# Patient Record
Sex: FEMALE | Race: WHITE | NOT HISPANIC OR LATINO
[De-identification: names, ages, dates, MRNs, and addresses within clinical notes are randomized per-mention and may not be internally consistent; named-entity substitution may affect disease eponyms.]

---

## 2018-04-23 ENCOUNTER — APPOINTMENT (OUTPATIENT)
Dept: ORTHOPEDIC SURGERY | Facility: CLINIC | Age: 83
End: 2018-04-23
Payer: MEDICARE

## 2018-04-23 VITALS
HEIGHT: 67 IN | WEIGHT: 155 LBS | DIASTOLIC BLOOD PRESSURE: 86 MMHG | BODY MASS INDEX: 24.33 KG/M2 | SYSTOLIC BLOOD PRESSURE: 172 MMHG | HEART RATE: 65 BPM

## 2018-04-23 PROCEDURE — 99214 OFFICE O/P EST MOD 30 MIN: CPT

## 2018-04-23 PROCEDURE — 73562 X-RAY EXAM OF KNEE 3: CPT | Mod: RT

## 2019-01-16 ENCOUNTER — APPOINTMENT (OUTPATIENT)
Dept: ORTHOPEDIC SURGERY | Facility: CLINIC | Age: 84
End: 2019-01-16
Payer: MEDICARE

## 2019-01-16 VITALS
SYSTOLIC BLOOD PRESSURE: 183 MMHG | HEART RATE: 58 BPM | DIASTOLIC BLOOD PRESSURE: 80 MMHG | HEIGHT: 67 IN | WEIGHT: 156 LBS | BODY MASS INDEX: 24.48 KG/M2

## 2019-01-16 PROCEDURE — 73562 X-RAY EXAM OF KNEE 3: CPT | Mod: RT

## 2019-01-16 PROCEDURE — 99214 OFFICE O/P EST MOD 30 MIN: CPT

## 2019-01-16 NOTE — HISTORY OF PRESENT ILLNESS
[de-identified] : Patient presents today for followup and reevaluation of her right total knee replacement. She did complain of some soreness and pain posteriorly and medially. She states that the pain level reaches 8/10 when it is bothering her a lot.The patient states that the pain is worse with activity and improved by rest. The patient denies numbness and tingling, radicular symptoms, or bowel/bladder dysfunction. She wears a compressive stocking which seems to improve her symptoms. She denies any clicking locking or buckling. She did note some slight swelling in the knee recently.

## 2019-01-16 NOTE — PHYSICAL EXAM
[de-identified] : The patient appears well nourished  and in no apparent distress.  The patient is alert and oriented to person, place, and time.   Affect and mood appear normal.    The head is normocephalic and atraumatic.  The eyes reveal normal sclera and extra ocular muscles are intact.   The neck appears normal with no jugular venous distention or masses noted.   Skin shows normal turgor with no evidence of eczema or psoriasis.  No respiratory distress noted.  The patient ambulates with a normal gait.\par \par The right knee has good range of motion from 0-125°. Some slight tenderness is noted in the posterior knee. No cords are palpable. No masses palpable.Negative Homans sign. There is no joint line tenderness. There is a negative [facility] sign. There is no effusion. There is no soft tissue swelling, warmth, or erythema.   There is a negative Lachman sign.  There is no instability to varus/valgus stress or anterior/posterior drawer.  There is normal strength in the in the quadriceps and hamstring muscles.  Sensation is intact to the lower estremity. There are normal pulses distally and good capillary refill. No edema or lymphadenopathy noted.  \par  [de-identified] : AP, lateral, and merchant views of the right knee were obtained.  The patient is status post total knee replacement.  The components are well fixed with good alignment. No evidence of loosening or periprosthetic fracture is noted.\par

## 2019-09-18 ENCOUNTER — APPOINTMENT (OUTPATIENT)
Dept: ORTHOPEDIC SURGERY | Facility: CLINIC | Age: 84
End: 2019-09-18
Payer: MEDICARE

## 2019-09-18 VITALS
DIASTOLIC BLOOD PRESSURE: 73 MMHG | SYSTOLIC BLOOD PRESSURE: 153 MMHG | HEART RATE: 57 BPM | BODY MASS INDEX: 24.48 KG/M2 | HEIGHT: 67 IN | WEIGHT: 156 LBS

## 2019-09-18 PROCEDURE — 20610 DRAIN/INJ JOINT/BURSA W/O US: CPT | Mod: LT

## 2019-09-18 PROCEDURE — 99214 OFFICE O/P EST MOD 30 MIN: CPT | Mod: 25

## 2019-09-18 PROCEDURE — 73564 X-RAY EXAM KNEE 4 OR MORE: CPT | Mod: LT

## 2020-02-23 ENCOUNTER — EMERGENCY (EMERGENCY)
Facility: HOSPITAL | Age: 85
LOS: 1 days | Discharge: HOME | End: 2020-02-23
Attending: EMERGENCY MEDICINE | Admitting: EMERGENCY MEDICINE
Payer: MEDICARE

## 2020-02-23 VITALS
SYSTOLIC BLOOD PRESSURE: 179 MMHG | OXYGEN SATURATION: 100 % | HEART RATE: 81 BPM | RESPIRATION RATE: 16 BRPM | TEMPERATURE: 98 F | DIASTOLIC BLOOD PRESSURE: 102 MMHG

## 2020-02-23 LAB
ALBUMIN SERPL ELPH-MCNC: 4.3 G/DL — SIGNIFICANT CHANGE UP (ref 3.3–5)
ALP SERPL-CCNC: 67 U/L — SIGNIFICANT CHANGE UP (ref 40–120)
ALT FLD-CCNC: 16 U/L — SIGNIFICANT CHANGE UP (ref 4–33)
ANION GAP SERPL CALC-SCNC: 11 MMO/L — SIGNIFICANT CHANGE UP (ref 7–14)
APPEARANCE UR: CLEAR — SIGNIFICANT CHANGE UP
AST SERPL-CCNC: 22 U/L — SIGNIFICANT CHANGE UP (ref 4–32)
BACTERIA # UR AUTO: HIGH
BASOPHILS # BLD AUTO: 0.05 K/UL — SIGNIFICANT CHANGE UP (ref 0–0.2)
BASOPHILS NFR BLD AUTO: 0.9 % — SIGNIFICANT CHANGE UP (ref 0–2)
BILIRUB SERPL-MCNC: 0.4 MG/DL — SIGNIFICANT CHANGE UP (ref 0.2–1.2)
BILIRUB UR-MCNC: NEGATIVE — SIGNIFICANT CHANGE UP
BLOOD UR QL VISUAL: NEGATIVE — SIGNIFICANT CHANGE UP
BUN SERPL-MCNC: 15 MG/DL — SIGNIFICANT CHANGE UP (ref 7–23)
CALCIUM SERPL-MCNC: 10.2 MG/DL — SIGNIFICANT CHANGE UP (ref 8.4–10.5)
CHLORIDE SERPL-SCNC: 101 MMOL/L — SIGNIFICANT CHANGE UP (ref 98–107)
CO2 SERPL-SCNC: 28 MMOL/L — SIGNIFICANT CHANGE UP (ref 22–31)
COLOR SPEC: SIGNIFICANT CHANGE UP
CREAT SERPL-MCNC: 0.89 MG/DL — SIGNIFICANT CHANGE UP (ref 0.5–1.3)
EOSINOPHIL # BLD AUTO: 0.02 K/UL — SIGNIFICANT CHANGE UP (ref 0–0.5)
EOSINOPHIL NFR BLD AUTO: 0.3 % — SIGNIFICANT CHANGE UP (ref 0–6)
GLUCOSE SERPL-MCNC: 100 MG/DL — HIGH (ref 70–99)
GLUCOSE UR-MCNC: NEGATIVE — SIGNIFICANT CHANGE UP
HCT VFR BLD CALC: 39.3 % — SIGNIFICANT CHANGE UP (ref 34.5–45)
HGB BLD-MCNC: 12.4 G/DL — SIGNIFICANT CHANGE UP (ref 11.5–15.5)
HYALINE CASTS # UR AUTO: NEGATIVE — SIGNIFICANT CHANGE UP
IMM GRANULOCYTES NFR BLD AUTO: 0.2 % — SIGNIFICANT CHANGE UP (ref 0–1.5)
KETONES UR-MCNC: NEGATIVE — SIGNIFICANT CHANGE UP
LEUKOCYTE ESTERASE UR-ACNC: SIGNIFICANT CHANGE UP
LYMPHOCYTES # BLD AUTO: 1.8 K/UL — SIGNIFICANT CHANGE UP (ref 1–3.3)
LYMPHOCYTES # BLD AUTO: 31.3 % — SIGNIFICANT CHANGE UP (ref 13–44)
MCHC RBC-ENTMCNC: 29.6 PG — SIGNIFICANT CHANGE UP (ref 27–34)
MCHC RBC-ENTMCNC: 31.6 % — LOW (ref 32–36)
MCV RBC AUTO: 93.8 FL — SIGNIFICANT CHANGE UP (ref 80–100)
MONOCYTES # BLD AUTO: 0.45 K/UL — SIGNIFICANT CHANGE UP (ref 0–0.9)
MONOCYTES NFR BLD AUTO: 7.8 % — SIGNIFICANT CHANGE UP (ref 2–14)
NEUTROPHILS # BLD AUTO: 3.42 K/UL — SIGNIFICANT CHANGE UP (ref 1.8–7.4)
NEUTROPHILS NFR BLD AUTO: 59.5 % — SIGNIFICANT CHANGE UP (ref 43–77)
NITRITE UR-MCNC: NEGATIVE — SIGNIFICANT CHANGE UP
NRBC # FLD: 0 K/UL — SIGNIFICANT CHANGE UP (ref 0–0)
PH UR: 7 — SIGNIFICANT CHANGE UP (ref 5–8)
PLATELET # BLD AUTO: 296 K/UL — SIGNIFICANT CHANGE UP (ref 150–400)
PMV BLD: 9.5 FL — SIGNIFICANT CHANGE UP (ref 7–13)
POTASSIUM SERPL-MCNC: 4 MMOL/L — SIGNIFICANT CHANGE UP (ref 3.5–5.3)
POTASSIUM SERPL-SCNC: 4 MMOL/L — SIGNIFICANT CHANGE UP (ref 3.5–5.3)
PROT SERPL-MCNC: 7.6 G/DL — SIGNIFICANT CHANGE UP (ref 6–8.3)
PROT UR-MCNC: NEGATIVE — SIGNIFICANT CHANGE UP
RBC # BLD: 4.19 M/UL — SIGNIFICANT CHANGE UP (ref 3.8–5.2)
RBC # FLD: 13.3 % — SIGNIFICANT CHANGE UP (ref 10.3–14.5)
RBC CASTS # UR COMP ASSIST: SIGNIFICANT CHANGE UP (ref 0–?)
SODIUM SERPL-SCNC: 140 MMOL/L — SIGNIFICANT CHANGE UP (ref 135–145)
SP GR SPEC: 1.01 — SIGNIFICANT CHANGE UP (ref 1–1.04)
SQUAMOUS # UR AUTO: SIGNIFICANT CHANGE UP
TROPONIN T, HIGH SENSITIVITY: 7 NG/L — SIGNIFICANT CHANGE UP (ref ?–14)
TROPONIN T, HIGH SENSITIVITY: 7 NG/L — SIGNIFICANT CHANGE UP (ref ?–14)
UROBILINOGEN FLD QL: NORMAL — SIGNIFICANT CHANGE UP
WBC # BLD: 5.75 K/UL — SIGNIFICANT CHANGE UP (ref 3.8–10.5)
WBC # FLD AUTO: 5.75 K/UL — SIGNIFICANT CHANGE UP (ref 3.8–10.5)
WBC UR QL: >50 — HIGH (ref 0–?)

## 2020-02-23 PROCEDURE — 70498 CT ANGIOGRAPHY NECK: CPT | Mod: 26

## 2020-02-23 PROCEDURE — 70496 CT ANGIOGRAPHY HEAD: CPT | Mod: 26

## 2020-02-23 PROCEDURE — 99218: CPT | Mod: GC

## 2020-02-23 RX ORDER — CEPHALEXIN 500 MG
500 CAPSULE ORAL ONCE
Refills: 0 | Status: COMPLETED | OUTPATIENT
Start: 2020-02-23 | End: 2020-02-23

## 2020-02-23 RX ORDER — SODIUM CHLORIDE 9 MG/ML
1000 INJECTION INTRAMUSCULAR; INTRAVENOUS; SUBCUTANEOUS ONCE
Refills: 0 | Status: COMPLETED | OUTPATIENT
Start: 2020-02-23 | End: 2020-02-23

## 2020-02-23 RX ORDER — ACETAMINOPHEN 500 MG
650 TABLET ORAL ONCE
Refills: 0 | Status: COMPLETED | OUTPATIENT
Start: 2020-02-23 | End: 2020-02-23

## 2020-02-23 RX ADMIN — SODIUM CHLORIDE 1000 MILLILITER(S): 9 INJECTION INTRAMUSCULAR; INTRAVENOUS; SUBCUTANEOUS at 14:43

## 2020-02-23 RX ADMIN — Medication 500 MILLIGRAM(S): at 19:42

## 2020-02-23 RX ADMIN — Medication 650 MILLIGRAM(S): at 22:30

## 2020-02-23 RX ADMIN — SODIUM CHLORIDE 1000 MILLILITER(S): 9 INJECTION INTRAMUSCULAR; INTRAVENOUS; SUBCUTANEOUS at 15:38

## 2020-02-23 RX ADMIN — Medication 650 MILLIGRAM(S): at 23:22

## 2020-02-23 NOTE — ED ADULT NURSE NOTE - OBJECTIVE STATEMENT
Pt received to room 21 AAO x 3 and ambulatory c/o right side headache that is chronic but worse and accompanied by lightheadedness and feeling unsteady since this AM. Pt states symptoms have improved since this AM and is able to ambulate with a steady gait. Labs sent, 18G placed to the right AC and NS infusing. Pt breathing with ease on room air and awaiting head CT.

## 2020-02-23 NOTE — ED PROVIDER NOTE - ATTENDING CONTRIBUTION TO CARE
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  Then she got out of bed and noticed difficulty walking.  On exam pt overall well appearing, in NAD, heart RRR, lungs CTAB, abd NTND, extremities without swelling, strength 5/5 in all extremities and skin without rash.  Speech normal.  Neck nontender.  Gait shuffling in ED--son states baseline is more fluid.

## 2020-02-23 NOTE — CONSULT NOTE ADULT - SUBJECTIVE AND OBJECTIVE BOX
HPI:    (Stroke only)  NIHSS:   MRS:   ICH:     REVIEW OF SYSTEMS  General:	  Skin/Breast:	  Ophthalmologic:  ENMT:	  Respiratory and Thorax:	  Cardiovascular:	  Gastrointestinal:	  Genitourinary:	  Musculoskeletal:	  Neurological:	  Psychiatric:	  Hematology/Lymphatics:	  Endocrine:	  Allergic/Immunologic:	    A 10-system ROS was performed and is negative except for those items noted above and/or in the HPI.    PAST MEDICAL & SURGICAL HISTORY:    FAMILY HISTORY:    SOCIAL HISTORY:   T/E/D:   Occupation:   Lives with:     MEDICATIONS (HOME):  Home Medications:    MEDICATIONS  (STANDING):  cephalexin 500 milliGRAM(s) Oral once    MEDICATIONS  (PRN):    ALLERGIES/INTOLERANCES:  Allergies  No Known Allergies    Intolerances    VITALS & EXAMINATION:  Vital Signs Last 24 Hrs  T(C): 36.6 (2020 16:34), Max: 36.9 (2020 12:47)  T(F): 97.8 (2020 16:34), Max: 98.5 (2020 12:47)  HR: 69 (2020 16:34) (69 - 81)  BP: 184/79 (2020 16:34) (179/102 - 184/79)  BP(mean): --  RR: 16 (2020 16:34) (16 - 16)  SpO2: 100% (2020 16:34) (100% - 100%)    General:  Constitutional: Obese Female, appears stated age, in no apparent distress including pain  Head: Normocephalic & atraumatic.  ENT: Patent ear canals, intact TM, mucus membranes moist & pink, neck supple, no lymphadenopathy.   Respiratory: Patent airway. All lung fields are clear to auscultation bilaterally.  Extremities: No cyanosis, clubbing, or edema.  Skin: No rashes, bruising, or discoloration.    Cardiovascular (>2): RRR no murmurs. Carotid pulsations symmetric, no bruits. Normal capillary beds refill, 1-2 seconds or less.     Neurological (>12):  MS: Awake, alert, oriented to person, place, situation, time. Normal affect. Follows all commands.    Language: Speech is clear, fluent with good repetition & comprehension (able to name objects___)    CNs: PERRLA (R = 3mm, L = 3mm). VFF. EOMI no nystagmus, no diplopia. V1-3 intact to LT/pinprick, well developed masseter muscles b/l. No facial asymmetry b/l, full eye closure strength b/l. Hearing grossly normal (rubbing fingers) b/l. Symmetric palate elevation in midline. Gag reflex deferred. Head turning & shoulder shrug intact b/l. Tongue midline, normal movements, no atrophy.    Fundoscopic: pale w/ sharp discs margins No vascular changes.      Motor: Normal muscle bulk & tone. No noticeable tremor or seizure. No pronator drift.              Deltoid	Biceps	Triceps	Wrist	Finger ABd	   R	5	5	5	5	5		5 	  L	5	5	5	5	5		5    	H-Flex	H-Ext	H-ABd	H-ADd	K-Flex	K-Ext	D-Flex	P-Flex  R	5	5	5	5	5	5	5	5 	   L	5	5	5	5	5	5	5	5	     Sensation: Intact to LT/PP/Temp/Vibration/Position b/l throughout.     Cortical: Extinction on DSS (neglect): none    Reflexes:              Biceps(C5)       BR(C6)     Triceps(C7)               Patellar(L4)    Achilles(S1)    Plantar Resp  R	2	          2	             2		        2		    2		Down   L	2	          2	             2		        2		    2		Down     Coordination: intact rapid-alt movements. No dysmetria to FTN/HTS    Gait: Normal Romberg. No postural instability. Normal stance and tandem gait.     LABORATORY:  CBC                       12.4   5.75  )-----------( 296      ( 2020 14:30 )             39.3     Chem 02-    140  |  101  |  15  ----------------------------<  100<H>  4.0   |  28  |  0.89    Ca    10.2      2020 14:30    TPro  7.6  /  Alb  4.3  /  TBili  0.4  /  DBili  x   /  AST  22  /  ALT  16  /  AlkPhos  67  02-23    LFTs LIVER FUNCTIONS - ( 2020 14:30 )  Alb: 4.3 g/dL / Pro: 7.6 g/dL / ALK PHOS: 67 u/L / ALT: 16 u/L / AST: 22 u/L / GGT: x           Coagulopathy   Lipid Panel   A1c   Cardiac enzymes     U/A Urinalysis Basic - ( 2020 14:30 )    Color: LIGHT YELLOW / Appearance: CLEAR / S.011 / pH: 7.0  Gluc: NEGATIVE / Ketone: NEGATIVE  / Bili: NEGATIVE / Urobili: NORMAL   Blood: NEGATIVE / Protein: NEGATIVE / Nitrite: NEGATIVE   Leuk Esterase: LARGE / RBC: 3-5 / WBC >50   Sq Epi: OCC / Non Sq Epi: x / Bacteria: MODERATE      CSF  Immunological  Other    STUDIES & IMAGING:  Studies (EKG, EEG, EMG, etc):     Radiology (XR, CT, MR, U/S, TTE/GATO): HPI: 87yo woman with history of right sided headaches presenting to [facility] ED for headaches associated with blurry vision and gait unsteadiness. Neurology consult for such. Patient reports to me that she has had headaches for the past 5 years that are typically right occipital and sometimes wake her up at night, however this time they woke her up around  and were associated with sudden onset blurry vision that she does not remember how long they lasted as she went back to bed soon after the symptoms started. Throughout the day she believes she has felt unsteady in her gait, has not fallen nor has not recently sustained head or neck trauma. Headache was in same region though more intense than usual. No associated photophobia, phonophobia, neck stiffness, no jaw claudication (has baseline right jaw pain from prior dental work in right part of jaw), no temporal pain, no pain with eye movements, no difficulty swallowing, no other visual disturbances, no loss of consciousness. She does endorse that in the past week, she has had some memory problems - for instance, her grandson told her yesterday that he would not be joining her for dinner and she cooked dinner for him. Denies problems with leaving stove on, denies getting lost when she walks outside, she does not drive at baseline. No trouble writing, no other associated visual disturbances. No history of smoking. Does not take any medications. has not been recently sick to her knowledge. Son at bedside denies patient having problems with memory, says patient usually walks fast and is otherwise sharp for her age. This kind of event/severity of headache and associated symptoms have never occurred before.    Of note in ED her blood pressure was elevated -180s.    REVIEW OF SYSTEMS	    A 10-system ROS was performed and is negative except for those items noted above and/or in the HPI.    PAST MEDICAL & SURGICAL HISTORY:    FAMILY HISTORY:     MEDICATIONS (HOME):  Home Medications:    MEDICATIONS  (STANDING):  cephalexin 500 milliGRAM(s) Oral once    MEDICATIONS  (PRN):    ALLERGIES/INTOLERANCES:  Allergies  No Known Allergies    VITALS & EXAMINATION:  Vital Signs Last 24 Hrs  T(C): 36.6 (2020 16:34), Max: 36.9 (2020 12:47)  T(F): 97.8 (2020 16:34), Max: 98.5 (2020 12:47)  HR: 69 (2020 16:34) (69 - 81)  BP: 184/79 (2020 16:34) (179/102 - 184/79)  BP(mean): --  RR: 16 (2020 16:34) (16 - 16)  SpO2: 100% (2020 16:34) (100% - 100%)    Neurological (>12):  MS: Awake, alert, oriented to person, place, situation, time. Normal affect. Follows all commands. 3/3 immediate and delayed recall.    Language: Speech is clear, fluent with good repetition & comprehension    CNs: PERRLA (R = 3mm, L = 3mm). VFF. EOMI no nystagmus. V1-3 intact to LT/pinprick, well developed masseter muscles b/l. No facial asymmetry b/l, full eye closure strength b/l. Hearing grossly normal (rubbing fingers) b/l. Symmetric palate elevation in midline. Gag reflex deferred. Head turning & shoulder shrug intact b/l. Tongue midline, normal movements, no atrophy.    No temporal tenderness, no neck stiffness    Motor: Normal muscle bulk & tone. No noticeable tremor or seizure. No pronator drift.              Deltoid	Biceps	Triceps	Wrist	Finger ABd	   R	5	5	5	5	5		5 	  L	5	5	5	5	5		5    	H-Flex	H-Ext	H-ABd	H-ADd	K-Flex	K-Ext	D-Flex	P-Flex  R	5	5	5	5	5	5	5	5 	   L	5	5	5	5	5	5	5	5	     Sensation: Intact to LT/Temp b/l throughout.     Cortical: Extinction on DSS (neglect): none    Reflexes:              Biceps(C5)       BR(C6)     Triceps(C7)               Patellar(L4)    Achilles(S1)    Plantar Resp  R	2	          2	             2		        1		    0		Down   L	2	          2	             2		        1		    0		Down     Coordination: intact rapid-alt movements. No dysmetria to FTN b/l    Gait: Patient reported feeling lightheaded when she went from laying to sitting up position. When she went from sitting up to standing, she did feel a sense of unsteadiness, denied room spinning dizziness, could walk, though strides were smaller than usual, and she once leaned a bit towards the left side, gait mildly wide.   can walk without assistance and get up without assistance.    LABORATORY:  CBC                       12.4   5.75  )-----------( 296      ( 2020 14:30 )             39.3     Chem     140  |  101  |  15  ----------------------------<  100<H>  4.0   |  28  |  0.89    Ca    10.2      2020 14:30    TPro  7.6  /  Alb  4.3  /  TBili  0.4  /  DBili  x   /  AST  22  /  ALT  16  /  AlkPhos  67      LFTs LIVER FUNCTIONS - ( 2020 14:30 )  Alb: 4.3 g/dL / Pro: 7.6 g/dL / ALK PHOS: 67 u/L / ALT: 16 u/L / AST: 22 u/L / GGT: x           Coagulopathy   Lipid Panel   A1c   Cardiac enzymes     U/A Urinalysis Basic - ( 2020 14:30 )    Color: LIGHT YELLOW / Appearance: CLEAR / S.011 / pH: 7.0  Gluc: NEGATIVE / Ketone: NEGATIVE  / Bili: NEGATIVE / Urobili: NORMAL   Blood: NEGATIVE / Protein: NEGATIVE / Nitrite: NEGATIVE   Leuk Esterase: LARGE / RBC: 3-5 / WBC >50   Sq Epi: OCC / Non Sq Epi: x / Bacteria: MODERATE      CSF  Immunological  Other    STUDIES & IMAGING:  Studies (EKG, EEG, EMG, etc):     Radiology (XR, CT, MR, U/S, TTE/GATO):    < from: CT Angio Neck w/ IV Cont (20 @ 17:40) >  IMPRESSION:  Patent cervical and intracranialmajor arterial vasculature without evidence of abrupt vessel cut off, hemodynamically significant stenosis, aneurysm, or dissection.    Noncontrast CT head demonstrates no acute intracranial hemorrhage.    < end of copied text >

## 2020-02-23 NOTE — ED PROVIDER NOTE - NEUROLOGICAL, MLM
Alert and oriented, no focal deficits, no motor or sensory deficits. Shuffling gait Alert and oriented, no focal deficits, no motor or sensory deficits. Shuffling gait but steady Alert and oriented, no focal deficits, no motor or sensory deficits. Shuffling gait and mildly unsteady

## 2020-02-23 NOTE — ED CDU PROVIDER INITIAL DAY NOTE - MEDICAL DECISION MAKING DETAILS
85 y/o F with HA associated with visual disturbance and unsteady gait accepted to CDU for MRI r/o stroke.

## 2020-02-23 NOTE — ED ADULT NURSE REASSESSMENT NOTE - NS ED NURSE REASSESS COMMENT FT1
covering primary RN for break pt is in bed A and Ox 3  in NAD resting comfortably, denies c/op or SOB, orders noted and completed.

## 2020-02-23 NOTE — CONSULT NOTE ADULT - ASSESSMENT
Assessment: 87yo woman with history of right sided headaches presenting to [facility] ED for headaches associated with blurry vision and gait unsteadiness. Neurological examination demonstrates hesitant and questionably mildly wide based gait. CT/CTA negative.     Impression: gait disturbance and headaches with associated blurry vision in setting of elevated blood pressure 2/2 ddx includes: PRES, hypertensive urgency, low suspicion of temporal arteritis, low suspicion of stroke    Recommendations:  [ ] Blood pressure to be gradually decreased per ED/primary team  [ ] would likely benefit from CDU for blood pressure control and MRI  [ ] MRI brain w/w/o contrast  [ ] metabolic and infectious workup  [ ] cbc, cmp, a1c, lipid profile, TSH, ESR, CRP    -Management & disposition to be discussed with Dr. Johnson

## 2020-02-23 NOTE — ED PROVIDER NOTE - CLINICAL SUMMARY MEDICAL DECISION MAKING FREE TEXT BOX
87 y/o denies pmh c/o waking up with a generalized headache x 2 am. Pt reports having headaches for the past 5-6 years, for which pt has seen multiple doctors for and had imaging performed with no diagnosis  no focal neuro deficits,  a/p: headache, worse than usual, will get cta head, neck, labs

## 2020-02-23 NOTE — ED PROVIDER NOTE - OBJECTIVE STATEMENT
87 y/o denies pmh c/o waking up with a generalized headache x 2 am. Pt reports having headaches for the past 5-6 years, for which pt has seen multiple doctors for with no diagnosis. Pt also reports when she got up from bed she experienced darkening vision and light headedness lasting for under a minute. Pt reports this headache earlier today was different than usual, generalized and severe. Pt headaches are usually on the R side. Denies fever, chills, cp, sob, abd pain, n/v/d. 85 y/o denies pmh c/o waking up with a generalized headache x 2 am. Pt reports having headaches for the past 5-6 years, for which pt has seen multiple doctors for and had imaging performed with no diagnosis. Pt reports when she got up from bed this morning shortly after the headache started, she experienced darkening vision and light headedness lasting for under a minute. Pt reports this headache earlier today was different than usual, more generalized and severe., where as pt headaches are usually on the R side. Did not take anything for the pain. Denies fever, chills, cp, sob, abd pain, n/v/d.

## 2020-02-23 NOTE — ED ADULT TRIAGE NOTE - CHIEF COMPLAINT QUOTE
pt c/o HA, neck pain and dizziness x years, with worsening of symptoms x few days. states she feels unsteady. pt had negative work up outpatient and was never diagnosed with anything. no fever/chills.  [id number]

## 2020-02-23 NOTE — ED PROVIDER NOTE - PROGRESS NOTE DETAILS
Neurology saw patient. Want MRI w/w/o contrast, recommend CDU for MRI and BP control. CDU called and will come evaluate patient.

## 2020-02-23 NOTE — ED CDU PROVIDER INITIAL DAY NOTE - ATTENDING CONTRIBUTION TO CARE
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  Then she got out of bed and noticed difficulty walking.  On exam pt overall well appearing, in NAD, heart RRR, lungs CTAB, abd NTND, extremities without swelling, strength 5/5 in all extremities and skin without rash.  Speech normal.  Neck nontender.  Gait shuffling in ED--son states baseline is more fluid.  To be placed in CDU for further neuro eval.

## 2020-02-23 NOTE — ED CDU PROVIDER INITIAL DAY NOTE - OBJECTIVE STATEMENT
Patient is a 87 y/o F denying any pmhx presenting with generalized HA associated with darkening vision, lightheadedness and unsteady gait. In ED , CT head and CTA were negative, labs did not reveal any abnormal values. patient accepted to CDU for MRI to r/o stroke. patient is being followed by neurology.

## 2020-02-23 NOTE — CONSULT NOTE ADULT - ATTENDING COMMENTS
I have personally seen, examined, and participated in the care of this patient on rounds 2/24/20 with the neurology team.  I have reviewed all pertinent clinical information, including history, physical examination, assessment and plan.   Hx as reported above with the following in addition: Pt was told years ago by physician that she has a neck problem.  Her son bought her a cervical heating pad; when she uses it her headache and neck pain sherry. I have personally seen, examined, and participated in the care of this patient on rounds 2/24/20 with the neurology team.  I have reviewed all pertinent clinical information, including history, physical examination, assessment and plan.   Hx as reported above with the following in addition: Pt was told many years ago (>10?) by a physician that she has a neck problem.  Her son bought her a cervical heating pad; when she uses it her headache and neck pain sherry.    On examination I note in particular/instead/in addition the following:    Alert. medium thin build. Normal expression, comprehension, articulation, prosody of speech.  Fields intact.  EOMI (checked conjugacy of gaze w red filter).  Tenderness to palpation )TTP) of:    upper trapezii bilat;   R SCM at mastoid insertion;   occipital notches bilaterally.      No TMJ, c-spine, scalp or paracervical TTP.     No vertigo or nystagmus elicited by     IMPRESSION    Cervical regional myofascial pain syndrome.    R-sided headache consistent with unilateral R-side SCM tenderness.    Greater occipital neuralgia - bilat.    Hx of cervicalgia managed by local heat.      Blurry vision in association with headache especially of cervical region origin is commonplace.      Not a primary headache syndrome.

## 2020-02-24 VITALS
DIASTOLIC BLOOD PRESSURE: 66 MMHG | TEMPERATURE: 98 F | HEART RATE: 64 BPM | OXYGEN SATURATION: 98 % | RESPIRATION RATE: 16 BRPM | SYSTOLIC BLOOD PRESSURE: 135 MMHG

## 2020-02-24 LAB — SPECIMEN SOURCE: SIGNIFICANT CHANGE UP

## 2020-02-24 PROCEDURE — 99284 EMERGENCY DEPT VISIT MOD MDM: CPT

## 2020-02-24 PROCEDURE — 99217: CPT | Mod: GC

## 2020-02-24 PROCEDURE — 70553 MRI BRAIN STEM W/O & W/DYE: CPT | Mod: 26

## 2020-02-24 NOTE — ED CDU PROVIDER SUBSEQUENT DAY NOTE - PROGRESS NOTE DETAILS
Pt feeling better after ER tsay, headache improved. Pt seen and eval by neuro and cleared for dc. Pt is stable for dc.

## 2020-02-24 NOTE — ED CDU PROVIDER SUBSEQUENT DAY NOTE - MEDICAL DECISION MAKING DETAILS
87 y/o F with HA associated with visual disturbance and unsteady gait accepted to CDU for MRI r/o stroke.

## 2020-02-24 NOTE — ED CDU PROVIDER SUBSEQUENT DAY NOTE - HISTORY
85 y/o F presenting with HA, visual disturbances, and unsteady gait accepted to CDU for MRI to r/o stroke. patient NAD, with no acute complaints

## 2020-02-24 NOTE — ED CDU PROVIDER SUBSEQUENT DAY NOTE - ATTENDING CONTRIBUTION TO CARE
Pt sent to CDU for HA with gait imbalance, now resolved and is able to ambulate around CDU w/o issue. MRI showing no e/o stroke, pt cleared by neuro for d/c.

## 2020-02-24 NOTE — ED CDU PROVIDER DISPOSITION NOTE - CLINICAL COURSE
Pt sent to CDU for stroke eval given headache ?ataxic gait, symptoms currently resolved, CTA head/neck and MRI w/o stroke or other acute findings. Pt cleared by neuro and will f/u with her neurologist as outpt.

## 2020-02-24 NOTE — ED ADULT NURSE REASSESSMENT NOTE - PUPILS PERRL
Problem: Pressure Injury - Risk of 
Goal: *Prevention of pressure injury Document Joni Scale and appropriate interventions in the flowsheet. Outcome: Progressing Towards Goal 
Pressure Injury Interventions: 
  
 
Moisture Interventions: Maintain skin hydration (lotion/cream), Limit adult briefs Activity Interventions: Increase time out of bed Mobility Interventions: Pressure redistribution bed/mattress (bed type) Nutrition Interventions: Offer support with meals,snacks and hydration yes

## 2020-02-24 NOTE — ED CDU PROVIDER DISPOSITION NOTE - CARE PROVIDER_API CALL
Hailey Johnson)  Neurology  General  [address] [place] [address]  Phone: [phone]  Fax: [phone]  Follow Up Time:

## 2020-02-24 NOTE — ED CDU PROVIDER DISPOSITION NOTE - PATIENT PORTAL LINK FT
You can access the FollowMyHealth Patient Portal offered by [facility] by registering at the following website: [email or web address]. By joining [facility]’s FollowMyHealth portal, you will also be able to view your health information using other applications (apps) compatible with our system.

## 2020-02-25 LAB
-  AMIKACIN: SIGNIFICANT CHANGE UP
-  AMPICILLIN/SULBACTAM: SIGNIFICANT CHANGE UP
-  AMPICILLIN: SIGNIFICANT CHANGE UP
-  AZTREONAM: SIGNIFICANT CHANGE UP
-  CEFAZOLIN: SIGNIFICANT CHANGE UP
-  CEFEPIME: SIGNIFICANT CHANGE UP
-  CEFOXITIN: SIGNIFICANT CHANGE UP
-  CEFTAZIDIME: SIGNIFICANT CHANGE UP
-  CEFTRIAXONE: SIGNIFICANT CHANGE UP
-  ERTAPENEM: SIGNIFICANT CHANGE UP
-  GENTAMICIN: SIGNIFICANT CHANGE UP
-  IMIPENEM: SIGNIFICANT CHANGE UP
-  LEVOFLOXACIN: SIGNIFICANT CHANGE UP
-  MEROPENEM: SIGNIFICANT CHANGE UP
-  NITROFURANTOIN: SIGNIFICANT CHANGE UP
-  PIPERACILLIN/TAZOBACTAM: SIGNIFICANT CHANGE UP
-  TIGECYCLINE: SIGNIFICANT CHANGE UP
-  TOBRAMYCIN: SIGNIFICANT CHANGE UP
-  TRIMETHOPRIM/SULFAMETHOXAZOLE: SIGNIFICANT CHANGE UP
BACTERIA UR CULT: SIGNIFICANT CHANGE UP
METHOD TYPE: SIGNIFICANT CHANGE UP
ORGANISM # SPEC MICROSCOPIC CNT: SIGNIFICANT CHANGE UP
ORGANISM # SPEC MICROSCOPIC CNT: SIGNIFICANT CHANGE UP

## 2020-02-26 NOTE — ED POST DISCHARGE NOTE - RESULT SUMMARY
UCX: Klebsiella pneumoniae > 100,000 No antibiotic listed in ED provider note or prescription writer at time of discharge. Patient contact # [phone] message left with Call Back  P.A. number and hours for return call back. Alt # [phone] message left with Call Back  P.A. number and hours for return call back. alt # [phone] person who answered phone hung up on me.

## 2020-03-03 RX ORDER — CEPHALEXIN 500 MG
1 CAPSULE ORAL
Qty: 15 | Refills: 0
Start: 2020-03-03 | End: 2020-03-07

## 2020-05-27 ENCOUNTER — APPOINTMENT (OUTPATIENT)
Dept: SURGERY | Facility: CLINIC | Age: 85
End: 2020-05-27
Payer: MEDICARE

## 2020-05-27 PROCEDURE — 99204 OFFICE O/P NEW MOD 45 MIN: CPT | Mod: 95

## 2020-05-28 NOTE — ASSESSMENT
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 I recommended laparoscopic wedge resection of the GIST, will plan for intraop EGD. I discussed the details, risks, benefits and alternatives of the procedure and expectations of recovery. \par She wishes to proceed and will schedule the surgery with my office.\par

## 2020-05-28 NOTE — CONSULT LETTER
[Dear  ___] : Dear  [unfilled], [Consult Letter:] : I had the pleasure of evaluating your patient, [unfilled]. [Please see my note below.] : Please see my note below. [Consult Closing:] : Thank you very much for allowing me to participate in the care of this patient.  If you have any questions, please do not hesitate to contact me. [Sincerely,] : Sincerely, [DrWestley  ___] : Dr. CUEVAS [FreeTextEntry3] : Aysha Rodriguez M.D., F.A.C.S., F.TRISH.S.C.R.S.\par Assistant Professor of Surgery\par [employer] at [facility]\par \par  [FreeTextEntry2] : Dr George Choi

## 2020-05-28 NOTE — HISTORY OF PRESENT ILLNESS
[Medical Office: ([facility])___] : at the medical office located in  [Home] : at home, [unfilled] , at the time of the visit. [Verbal consent obtained from patient] : the patient, [unfilled] [Family Member] : family member [FreeTextEntry1] : HERBERTH ASTUDILLO 86 year old female here for a consultation for a gastric GIST. The pt reports epigastric postprandial pain for the last few months that has been worsening and currently she is able to tolerate only some foods and has lost weight. The pain occurs about an hour after a meal. EGD in March revealed a large submucosal growth of the fundus. CT of the abdomen from 02/27/20 was amended after the EGD and identified the GIST. Recent EUS showed a 2.2 cm submucosal tumor 2 cm below the GE junction. Bx c/w low grade GIST.\par Colonoscopy from  05/19/16 demonstrated one 3 mm polyp in the ascending colon. Diverticulosis in the sigmoid colon, external hemorrhoids.Pathology: Sessile serrated adenoma. \par

## 2020-06-04 ENCOUNTER — OUTPATIENT (OUTPATIENT)
Dept: OUTPATIENT SERVICES | Facility: HOSPITAL | Age: 85
LOS: 1 days | End: 2020-06-04
Payer: MEDICARE

## 2020-06-04 VITALS
SYSTOLIC BLOOD PRESSURE: 157 MMHG | HEIGHT: 66 IN | OXYGEN SATURATION: 99 % | WEIGHT: 136.91 LBS | RESPIRATION RATE: 18 BRPM | DIASTOLIC BLOOD PRESSURE: 84 MMHG | HEART RATE: 86 BPM | TEMPERATURE: 98 F

## 2020-06-04 DIAGNOSIS — Z98.890 OTHER SPECIFIED POSTPROCEDURAL STATES: Chronic | ICD-10-CM

## 2020-06-04 DIAGNOSIS — Z29.9 ENCOUNTER FOR PROPHYLACTIC MEASURES, UNSPECIFIED: ICD-10-CM

## 2020-06-04 DIAGNOSIS — Z96.659 PRESENCE OF UNSPECIFIED ARTIFICIAL KNEE JOINT: Chronic | ICD-10-CM

## 2020-06-04 DIAGNOSIS — Z01.818 ENCOUNTER FOR OTHER PREPROCEDURAL EXAMINATION: ICD-10-CM

## 2020-06-04 DIAGNOSIS — C49.9 MALIGNANT NEOPLASM OF CONNECTIVE AND SOFT TISSUE, UNSPECIFIED: ICD-10-CM

## 2020-06-04 DIAGNOSIS — C49.A0 GASTROINTESTINAL STROMAL TUMOR, UNSPECIFIED SITE: ICD-10-CM

## 2020-06-04 DIAGNOSIS — I10 ESSENTIAL (PRIMARY) HYPERTENSION: ICD-10-CM

## 2020-06-04 LAB
ANION GAP SERPL CALC-SCNC: 11 MMOL/L — SIGNIFICANT CHANGE UP (ref 5–17)
BLD GP AB SCN SERPL QL: NEGATIVE — SIGNIFICANT CHANGE UP
BUN SERPL-MCNC: 11 MG/DL — SIGNIFICANT CHANGE UP (ref 7–23)
CALCIUM SERPL-MCNC: 10 MG/DL — SIGNIFICANT CHANGE UP (ref 8.4–10.5)
CHLORIDE SERPL-SCNC: 101 MMOL/L — SIGNIFICANT CHANGE UP (ref 96–108)
CO2 SERPL-SCNC: 26 MMOL/L — SIGNIFICANT CHANGE UP (ref 22–31)
CREAT SERPL-MCNC: 0.9 MG/DL — SIGNIFICANT CHANGE UP (ref 0.5–1.3)
GLUCOSE SERPL-MCNC: 97 MG/DL — SIGNIFICANT CHANGE UP (ref 70–99)
HCT VFR BLD CALC: 38.5 % — SIGNIFICANT CHANGE UP (ref 34.5–45)
HGB BLD-MCNC: 12.4 G/DL — SIGNIFICANT CHANGE UP (ref 11.5–15.5)
MCHC RBC-ENTMCNC: 29.2 PG — SIGNIFICANT CHANGE UP (ref 27–34)
MCHC RBC-ENTMCNC: 32.2 GM/DL — SIGNIFICANT CHANGE UP (ref 32–36)
MCV RBC AUTO: 90.8 FL — SIGNIFICANT CHANGE UP (ref 80–100)
NRBC # BLD: 0 /100 WBCS — SIGNIFICANT CHANGE UP (ref 0–0)
PLATELET # BLD AUTO: 308 K/UL — SIGNIFICANT CHANGE UP (ref 150–400)
POTASSIUM SERPL-MCNC: 4.6 MMOL/L — SIGNIFICANT CHANGE UP (ref 3.5–5.3)
POTASSIUM SERPL-SCNC: 4.6 MMOL/L — SIGNIFICANT CHANGE UP (ref 3.5–5.3)
RBC # BLD: 4.24 M/UL — SIGNIFICANT CHANGE UP (ref 3.8–5.2)
RBC # FLD: 13.8 % — SIGNIFICANT CHANGE UP (ref 10.3–14.5)
RH IG SCN BLD-IMP: POSITIVE — SIGNIFICANT CHANGE UP
SODIUM SERPL-SCNC: 138 MMOL/L — SIGNIFICANT CHANGE UP (ref 135–145)
WBC # BLD: 5.23 K/UL — SIGNIFICANT CHANGE UP (ref 3.8–10.5)
WBC # FLD AUTO: 5.23 K/UL — SIGNIFICANT CHANGE UP (ref 3.8–10.5)

## 2020-06-04 PROCEDURE — 80048 BASIC METABOLIC PNL TOTAL CA: CPT

## 2020-06-04 PROCEDURE — 85027 COMPLETE CBC AUTOMATED: CPT

## 2020-06-04 PROCEDURE — 86850 RBC ANTIBODY SCREEN: CPT

## 2020-06-04 PROCEDURE — G0463: CPT

## 2020-06-04 PROCEDURE — 86901 BLOOD TYPING SEROLOGIC RH(D): CPT

## 2020-06-04 PROCEDURE — 86900 BLOOD TYPING SEROLOGIC ABO: CPT

## 2020-06-04 RX ORDER — CEFAZOLIN SODIUM 1 G
2000 VIAL (EA) INJECTION ONCE
Refills: 0 | Status: DISCONTINUED | OUTPATIENT
Start: 2020-06-09 | End: 2020-06-10

## 2020-06-04 NOTE — H&P PST ADULT - HISTORY OF PRESENT ILLNESS
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

## 2020-06-04 NOTE — H&P PST ADULT - NSICDXPROBLEM_GEN_ALL_CORE_FT
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 Surgical patients in this group will benefit from both pharmacologic prophylaxis and intermittent compression devices.  The surgical team will determine the balance between VTE risk and bleeding risk, and other clinical considerations

## 2020-06-04 NOTE — H&P PST ADULT - ASSESSMENT
malignant neoplasm other connective and soft tissue  CAPRINI VTE 2.0 SCORE [CLOT updated 2019]    AGE RELATED RISK FACTORS                                                       MOBILITY RELATED FACTORS  [ ] Age 41-60 years                                            (1 Point)                    [ ] Bed rest                                                        (1 Point)  [ ] Age: 61-74 years                                           (2 Points)                  [ ] Plaster cast                                                   (2 Points)  [x ] Age= 75 years                                              (3 Points)                    [ ] Bed bound for more than 72 hours                 (2 Points)    DISEASE RELATED RISK FACTORS                                               GENDER SPECIFIC FACTORS  [ ] Edema in the lower extremities                       (1 Point)              [ ] Pregnancy                                                     (1 Point)  [ ] Varicose veins                                               (1 Point)                     [ ] Post-partum < 6 weeks                                   (1 Point)             [ ] BMI > 25 Kg/m2                                            (1 Point)                     [ ] Hormonal therapy  or oral contraception          (1 Point)                 [ ] Sepsis (in the previous month)                        (1 Point)               [ ] History of pregnancy complications                 (1 point)  [ ] Pneumonia or serious lung disease                                               [ ] Unexplained or recurrent                     (1 Point)           (in the previous month)                               (1 Point)  [ ] Abnormal pulmonary function test                     (1 Point)                 SURGERY RELATED RISK FACTORS  [ ] Acute myocardial infarction                              (1 Point)               [ ]  Section                                             (1 Point)  [ ] Congestive heart failure (in the previous month)  (1 Point)      [ ] Minor surgery                                                  (1 Point)   [ ] Inflammatory bowel disease                             (1 Point)               [ ] Arthroscopic surgery                                        (2 Points)  [ ] Central venous access                                      (2 Points)                [x ] General surgery lasting more than 45 minutes (2 points)  [x ] Malignancy- Present or previous                   (2 Points)                [ ] Elective arthroplasty                                         (5 points)    [ ] Stroke (in the previous month)                          (5 Points)                                                                                                                                                           HEMATOLOGY RELATED FACTORS                                                 TRAUMA RELATED RISK FACTORS  [ ] Prior episodes of VTE                                     (3 Points)                [ ] Fracture of the hip, pelvis, or leg                       (5 Points)  [ ] Positive family history for VTE                         (3 Points)             [ ] Acute spinal cord injury (in the previous month)  (5 Points)  [ ] Prothrombin 71958 A                                     (3 Points)               [ ] Paralysis  (less than 1 month)                             (5 Points)  [ ] Factor V Leiden                                             (3 Points)                  [ ] Multiple Trauma within 1 month                        (5 Points)  [ ] Lupus anticoagulants                                     (3 Points)                                                           [ ] Anticardiolipin antibodies                               (3 Points)                                                       [ ] High homocysteine in the blood                      (3 Points)                                             [ ] Other congenital or acquired thrombophilia      (3 Points)                                                [ ] Heparin induced thrombocytopenia                  (3 Points)                                     Total Score [     7     ]

## 2020-06-04 NOTE — H&P PST ADULT - HEALTH CARE MAINTENANCE
CHIEF COMPLAINT:   Chief Complaint   Patient presents with   • Derm Problem     Yearly Skin Check        HISTORY: Skin check, history of skin cancers, eroded pearly edge papule, left lateral abdomen     PHYSICAL EXAMINATION: Eroded pearly edge papule on the left lateral abdomen    FOLLOW-UP: No Follow-up on file.    There are no diagnoses linked to this encounter.     I have reviewed and agree with my Nurse Practitioner Zeus barnes, ARINP, NP, DCNP, documentation.  The documentation recorded by the cameron accurately and completely reflects the service(s) I personally performed and the decisions made by me.        see PCP twice a year

## 2020-06-06 ENCOUNTER — OUTPATIENT (OUTPATIENT)
Dept: OUTPATIENT SERVICES | Facility: HOSPITAL | Age: 85
LOS: 1 days | End: 2020-06-06
Payer: MEDICARE

## 2020-06-06 DIAGNOSIS — Z11.59 ENCOUNTER FOR SCREENING FOR OTHER VIRAL DISEASES: ICD-10-CM

## 2020-06-06 DIAGNOSIS — Z96.659 PRESENCE OF UNSPECIFIED ARTIFICIAL KNEE JOINT: Chronic | ICD-10-CM

## 2020-06-06 DIAGNOSIS — Z98.890 OTHER SPECIFIED POSTPROCEDURAL STATES: Chronic | ICD-10-CM

## 2020-06-06 PROBLEM — M19.90 UNSPECIFIED OSTEOARTHRITIS, UNSPECIFIED SITE: Chronic | Status: ACTIVE | Noted: 2020-06-04

## 2020-06-06 PROBLEM — I10 ESSENTIAL (PRIMARY) HYPERTENSION: Chronic | Status: ACTIVE | Noted: 2020-06-04

## 2020-06-06 LAB — SARS-COV-2 RNA SPEC QL NAA+PROBE: SIGNIFICANT CHANGE UP

## 2020-06-06 PROCEDURE — U0003: CPT

## 2020-06-08 ENCOUNTER — TRANSCRIPTION ENCOUNTER (OUTPATIENT)
Age: 85
End: 2020-06-08

## 2020-06-08 VITALS — HEIGHT: 65.75 IN | WEIGHT: 136.69 LBS

## 2020-06-08 NOTE — PRE-ANESTHESIA EVALUATION ADULT - NSANTHPMHFT_GEN_ALL_CORE
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 pt denies fever, shortness of breath , cough , in contact with anyone with COVID. +12lb weight loss in 2-3 months    Able to walk up stairs without chest pain/shortness of breath.

## 2020-06-08 NOTE — PRE-ANESTHESIA EVALUATION ADULT - NSANTHAIRWAYFT_ENT_ALL_CORE
Full upper denture, partial lower denture, remaining teeth intact. Good oropharyngeal opening. FROM of neck.

## 2020-06-08 NOTE — PRE-ANESTHESIA EVALUATION ADULT - NSANTHPEFT_GEN_ALL_CORE
T(C): 37 (06-09-20 @ 06:50), Max: 37 (06-09-20 @ 06:50)  HR: 76 (06-09-20 @ 06:50) (76 - 76)  BP: 147/93 (06-09-20 @ 06:50) (147/93 - 147/93)  RR: 18 (06-09-20 @ 06:50) (18 - 18)  SpO2: 100% (06-09-20 @ 06:50) (100% - 100%)    Pulmonary: Clear to auscultation bilaterally, no wheezing, rales, or ronchi  Cardiovascular: Regular rate and rhythm, normal S1/S2, no murmurs, rubs, or gallops  Extremities: No edema, pulses 2+ bilaterally in the upper and lower extremities  Neuro: Grossly intact in all extremities

## 2020-06-09 ENCOUNTER — APPOINTMENT (OUTPATIENT)
Dept: SURGERY | Facility: HOSPITAL | Age: 85
End: 2020-06-09
Payer: MEDICARE

## 2020-06-09 ENCOUNTER — RESULT REVIEW (OUTPATIENT)
Age: 85
End: 2020-06-09

## 2020-06-09 ENCOUNTER — INPATIENT (INPATIENT)
Facility: HOSPITAL | Age: 85
LOS: 0 days | Discharge: HOME | DRG: 376 | End: 2020-06-10
Attending: SURGERY | Admitting: SURGERY
Payer: MEDICARE

## 2020-06-09 DIAGNOSIS — Z96.659 PRESENCE OF UNSPECIFIED ARTIFICIAL KNEE JOINT: Chronic | ICD-10-CM

## 2020-06-09 DIAGNOSIS — Z98.890 OTHER SPECIFIED POSTPROCEDURAL STATES: Chronic | ICD-10-CM

## 2020-06-09 DIAGNOSIS — C49.A0 GASTROINTESTINAL STROMAL TUMOR, UNSPECIFIED SITE: ICD-10-CM

## 2020-06-09 LAB
BLD GP AB SCN SERPL QL: NEGATIVE — SIGNIFICANT CHANGE UP
RH IG SCN BLD-IMP: POSITIVE — SIGNIFICANT CHANGE UP

## 2020-06-09 PROCEDURE — 43659 UNLISTED LAPS PX STOMACH: CPT | Mod: 82

## 2020-06-09 PROCEDURE — 43259 EGD US EXAM DUODENUM/JEJUNUM: CPT

## 2020-06-09 PROCEDURE — 43659 UNLISTED LAPS PX STOMACH: CPT

## 2020-06-09 PROCEDURE — 88309 TISSUE EXAM BY PATHOLOGIST: CPT | Mod: 26

## 2020-06-09 PROCEDURE — 88341 IMHCHEM/IMCYTCHM EA ADD ANTB: CPT | Mod: 26

## 2020-06-09 PROCEDURE — 88342 IMHCHEM/IMCYTCHM 1ST ANTB: CPT | Mod: 26

## 2020-06-09 RX ORDER — ACETAMINOPHEN 500 MG
1000 TABLET ORAL ONCE
Refills: 0 | Status: COMPLETED | OUTPATIENT
Start: 2020-06-09 | End: 2020-06-09

## 2020-06-09 RX ORDER — HEPARIN SODIUM 5000 [USP'U]/ML
5000 INJECTION INTRAVENOUS; SUBCUTANEOUS EVERY 8 HOURS
Refills: 0 | Status: DISCONTINUED | OUTPATIENT
Start: 2020-06-09 | End: 2020-06-10

## 2020-06-09 RX ORDER — HYDROMORPHONE HYDROCHLORIDE 2 MG/ML
0.25 INJECTION INTRAMUSCULAR; INTRAVENOUS; SUBCUTANEOUS
Refills: 0 | Status: DISCONTINUED | OUTPATIENT
Start: 2020-06-09 | End: 2020-06-09

## 2020-06-09 RX ORDER — ACETAMINOPHEN 500 MG
1000 TABLET ORAL ONCE
Refills: 0 | Status: COMPLETED | OUTPATIENT
Start: 2020-06-10 | End: 2020-06-10

## 2020-06-09 RX ORDER — ONDANSETRON 8 MG/1
4 TABLET, FILM COATED ORAL ONCE
Refills: 0 | Status: DISCONTINUED | OUTPATIENT
Start: 2020-06-09 | End: 2020-06-09

## 2020-06-09 RX ORDER — HYDROMORPHONE HYDROCHLORIDE 2 MG/ML
0.5 INJECTION INTRAMUSCULAR; INTRAVENOUS; SUBCUTANEOUS
Refills: 0 | Status: DISCONTINUED | OUTPATIENT
Start: 2020-06-09 | End: 2020-06-09

## 2020-06-09 RX ORDER — CHLORHEXIDINE GLUCONATE 213 G/1000ML
1 SOLUTION TOPICAL ONCE
Refills: 0 | Status: COMPLETED | OUTPATIENT
Start: 2020-06-09 | End: 2020-06-09

## 2020-06-09 RX ORDER — ONDANSETRON 8 MG/1
4 TABLET, FILM COATED ORAL EVERY 6 HOURS
Refills: 0 | Status: DISCONTINUED | OUTPATIENT
Start: 2020-06-09 | End: 2020-06-10

## 2020-06-09 RX ORDER — SODIUM CHLORIDE 9 MG/ML
1000 INJECTION, SOLUTION INTRAVENOUS
Refills: 0 | Status: DISCONTINUED | OUTPATIENT
Start: 2020-06-09 | End: 2020-06-09

## 2020-06-09 RX ORDER — SODIUM CHLORIDE 9 MG/ML
3 INJECTION INTRAMUSCULAR; INTRAVENOUS; SUBCUTANEOUS EVERY 8 HOURS
Refills: 0 | Status: DISCONTINUED | OUTPATIENT
Start: 2020-06-09 | End: 2020-06-09

## 2020-06-09 RX ORDER — OXYCODONE HYDROCHLORIDE 5 MG/1
5 TABLET ORAL EVERY 6 HOURS
Refills: 0 | Status: DISCONTINUED | OUTPATIENT
Start: 2020-06-09 | End: 2020-06-10

## 2020-06-09 RX ORDER — PANTOPRAZOLE SODIUM 20 MG/1
40 TABLET, DELAYED RELEASE ORAL DAILY
Refills: 0 | Status: DISCONTINUED | OUTPATIENT
Start: 2020-06-09 | End: 2020-06-10

## 2020-06-09 RX ORDER — HYOSCYAMINE SULFATE 0.13 MG
0.12 TABLET ORAL EVERY 4 HOURS
Refills: 0 | Status: DISCONTINUED | OUTPATIENT
Start: 2020-06-09 | End: 2020-06-10

## 2020-06-09 RX ORDER — ACETAMINOPHEN 500 MG
1000 TABLET ORAL ONCE
Refills: 0 | Status: DISCONTINUED | OUTPATIENT
Start: 2020-06-10 | End: 2020-06-10

## 2020-06-09 RX ORDER — LIDOCAINE HCL 20 MG/ML
0.2 VIAL (ML) INJECTION ONCE
Refills: 0 | Status: COMPLETED | OUTPATIENT
Start: 2020-06-09 | End: 2020-06-09

## 2020-06-09 RX ADMIN — PANTOPRAZOLE SODIUM 40 MILLIGRAM(S): 20 TABLET, DELAYED RELEASE ORAL at 12:52

## 2020-06-09 RX ADMIN — CHLORHEXIDINE GLUCONATE 1 APPLICATION(S): 213 SOLUTION TOPICAL at 07:15

## 2020-06-09 RX ADMIN — HEPARIN SODIUM 5000 UNIT(S): 5000 INJECTION INTRAVENOUS; SUBCUTANEOUS at 14:09

## 2020-06-09 RX ADMIN — HEPARIN SODIUM 5000 UNIT(S): 5000 INJECTION INTRAVENOUS; SUBCUTANEOUS at 23:11

## 2020-06-09 RX ADMIN — Medication 400 MILLIGRAM(S): at 23:08

## 2020-06-09 RX ADMIN — SODIUM CHLORIDE 100 MILLILITER(S): 9 INJECTION, SOLUTION INTRAVENOUS at 12:56

## 2020-06-09 RX ADMIN — SODIUM CHLORIDE 3 MILLILITER(S): 9 INJECTION INTRAMUSCULAR; INTRAVENOUS; SUBCUTANEOUS at 07:15

## 2020-06-09 RX ADMIN — Medication 400 MILLIGRAM(S): at 17:18

## 2020-06-09 RX ADMIN — Medication 0.2 MILLILITER(S): at 07:15

## 2020-06-09 NOTE — CHART NOTE - NSCHARTNOTEFT_GEN_A_CORE
Surgery Post-Op Note    Pre-Op Dx: Gastrointestinal stromal tumor (GIST) of stomach  Procedure:    Laparoscopic partial gastrectomy     Surgeon:      Subjective:   Pt seen and examined at the bedside. Pt w/ no complaints. Denies F/C/N/V. Pain controlled with medication. Reports urinating, no gas yet.     Vital Signs Last 24 Hrs  T(C): 36.3 (09 Jun 2020 12:28), Max: 37 (09 Jun 2020 06:50)  T(F): 97.3 (09 Jun 2020 12:28), Max: 98.6 (09 Jun 2020 06:50)  HR: 65 (09 Jun 2020 12:28) (58 - 76)  BP: 153/80 (09 Jun 2020 12:28) (147/93 - 160/85)  BP(mean): 114 (09 Jun 2020 11:45) (109 - 116)  RR: 16 (09 Jun 2020 12:28) (16 - 18)  SpO2: 97% (09 Jun 2020 12:28) (96% - 100%)    Physical Exam:  General: NAD, resting comfortably in bed  Neuro: A/O x 3, no focal deficits  Pulmonary: Nonlabored breathing, no respiratory distress  Cardiovascular: NSR  Abdominal: soft, ATTP. ND  Incision: C/D/I   Drains:   Extremities: WWP      LABS:    MEDICATIONS  (STANDING):  acetaminophen  IVPB .. 1000 milliGRAM(s) IV Intermittent once  acetaminophen  IVPB .. 1000 milliGRAM(s) IV Intermittent once  ceFAZolin   IVPB 2000 milliGRAM(s) IV Intermittent once  heparin   Injectable 5000 Unit(s) SubCutaneous every 8 hours  lactated ringers. 1000 milliLiter(s) (100 mL/Hr) IV Continuous <Continuous>  pantoprazole  Injectable 40 milliGRAM(s) IV Push daily      ABO Interpretation: O (06-09 @ 07:20)      Radiology and Additional Studies:      Assessment: 86y Female s/p laparoscopic partal gastrectomy for malignant gastrointestinal stromal tumor (GIST) of stomach.    Plan:    IVF, Diet: Clear liquids, advance to LRD possibly tomorrow  Pain/nausea control PRN  Incentive spirometer/OOB/Ambulate  DVT prophylaxis Surgery Post-Op Note    Pre-Op Dx: Gastrointestinal stromal tumor (GIST) of stomach  Procedure:    Laparoscopic partial gastrectomy     Surgeon:      Subjective:   Pt seen and examined at the bedside. Pt w/ no complaints. Denies F/C/N/V. Pain controlled with medication. Reports urinating, no gas yet.     Vital Signs Last 24 Hrs  T(C): 36.3 (09 Jun 2020 12:28), Max: 37 (09 Jun 2020 06:50)  T(F): 97.3 (09 Jun 2020 12:28), Max: 98.6 (09 Jun 2020 06:50)  HR: 65 (09 Jun 2020 12:28) (58 - 76)  BP: 153/80 (09 Jun 2020 12:28) (147/93 - 160/85)  BP(mean): 114 (09 Jun 2020 11:45) (109 - 116)  RR: 16 (09 Jun 2020 12:28) (16 - 18)  SpO2: 97% (09 Jun 2020 12:28) (96% - 100%)    Physical Exam:  General: NAD, resting comfortably in bed  Neuro: A/O x 3, no focal deficits  Pulmonary: Nonlabored breathing, no respiratory distress  Cardiovascular: NSR  Abdominal: soft, ATTP. ND  Incision: C/D/I   Drains:   Extremities: WWP      LABS:    MEDICATIONS  (STANDING):  acetaminophen  IVPB .. 1000 milliGRAM(s) IV Intermittent once  acetaminophen  IVPB .. 1000 milliGRAM(s) IV Intermittent once  ceFAZolin   IVPB 2000 milliGRAM(s) IV Intermittent once  heparin   Injectable 5000 Unit(s) SubCutaneous every 8 hours  lactated ringers. 1000 milliLiter(s) (100 mL/Hr) IV Continuous <Continuous>  pantoprazole  Injectable 40 milliGRAM(s) IV Push daily      ABO Interpretation: O (06-09 @ 07:20)      Radiology and Additional Studies:      Assessment: 86y Female s/p laparoscopic partal gastrectomy for malignant gastrointestinal stromal tumor (GIST) of stomach.    Plan:    IVF, Diet: Clear liquids   Pain/nausea control PRN  Incentive spirometer/OOB/Ambulate  DVT prophylaxis

## 2020-06-09 NOTE — BRIEF OPERATIVE NOTE - NSICDXBRIEFPOSTOP_GEN_ALL_CORE_FT
POST-OP DIAGNOSIS:  Malignant gastrointestinal stromal tumor (GIST) of stomach 09-Jun-2020 10:29:58  Lynnette Mendez

## 2020-06-09 NOTE — BRIEF OPERATIVE NOTE - NSICDXBRIEFPROCEDURE_GEN_ALL_CORE_FT
PROCEDURES:  EGD 09-Jun-2020 10:29:24  Lynnette Mendez  Laparoscopic partial gastrectomy 09-Jun-2020 10:29:12  Lynnette Mendez

## 2020-06-09 NOTE — BRIEF OPERATIVE NOTE - NSICDXBRIEFPREOP_GEN_ALL_CORE_FT
PRE-OP DIAGNOSIS:  Gastrointestinal stromal tumor (GIST) of stomach 09-Jun-2020 10:29:43  Lynnette Mendez

## 2020-06-10 ENCOUNTER — TRANSCRIPTION ENCOUNTER (OUTPATIENT)
Age: 85
End: 2020-06-10

## 2020-06-10 VITALS
HEART RATE: 59 BPM | TEMPERATURE: 98 F | OXYGEN SATURATION: 97 % | DIASTOLIC BLOOD PRESSURE: 68 MMHG | SYSTOLIC BLOOD PRESSURE: 123 MMHG | RESPIRATION RATE: 18 BRPM

## 2020-06-10 LAB
ANION GAP SERPL CALC-SCNC: 10 MMOL/L — SIGNIFICANT CHANGE UP (ref 5–17)
BASOPHILS # BLD AUTO: 0.02 K/UL — SIGNIFICANT CHANGE UP (ref 0–0.2)
BASOPHILS NFR BLD AUTO: 0.3 % — SIGNIFICANT CHANGE UP (ref 0–2)
BUN SERPL-MCNC: 12 MG/DL — SIGNIFICANT CHANGE UP (ref 7–23)
CALCIUM SERPL-MCNC: 8.9 MG/DL — SIGNIFICANT CHANGE UP (ref 8.4–10.5)
CHLORIDE SERPL-SCNC: 100 MMOL/L — SIGNIFICANT CHANGE UP (ref 96–108)
CO2 SERPL-SCNC: 26 MMOL/L — SIGNIFICANT CHANGE UP (ref 22–31)
CREAT SERPL-MCNC: 0.97 MG/DL — SIGNIFICANT CHANGE UP (ref 0.5–1.3)
EOSINOPHIL # BLD AUTO: 0.01 K/UL — SIGNIFICANT CHANGE UP (ref 0–0.5)
EOSINOPHIL NFR BLD AUTO: 0.1 % — SIGNIFICANT CHANGE UP (ref 0–6)
GLUCOSE SERPL-MCNC: 92 MG/DL — SIGNIFICANT CHANGE UP (ref 70–99)
HCT VFR BLD CALC: 30.8 % — LOW (ref 34.5–45)
HGB BLD-MCNC: 9.8 G/DL — LOW (ref 11.5–15.5)
IMM GRANULOCYTES NFR BLD AUTO: 0.1 % — SIGNIFICANT CHANGE UP (ref 0–1.5)
LYMPHOCYTES # BLD AUTO: 1.78 K/UL — SIGNIFICANT CHANGE UP (ref 1–3.3)
LYMPHOCYTES # BLD AUTO: 25.3 % — SIGNIFICANT CHANGE UP (ref 13–44)
MAGNESIUM SERPL-MCNC: 2 MG/DL — SIGNIFICANT CHANGE UP (ref 1.6–2.6)
MCHC RBC-ENTMCNC: 29.1 PG — SIGNIFICANT CHANGE UP (ref 27–34)
MCHC RBC-ENTMCNC: 31.8 GM/DL — LOW (ref 32–36)
MCV RBC AUTO: 91.4 FL — SIGNIFICANT CHANGE UP (ref 80–100)
MONOCYTES # BLD AUTO: 0.54 K/UL — SIGNIFICANT CHANGE UP (ref 0–0.9)
MONOCYTES NFR BLD AUTO: 7.7 % — SIGNIFICANT CHANGE UP (ref 2–14)
NEUTROPHILS # BLD AUTO: 4.68 K/UL — SIGNIFICANT CHANGE UP (ref 1.8–7.4)
NEUTROPHILS NFR BLD AUTO: 66.5 % — SIGNIFICANT CHANGE UP (ref 43–77)
NRBC # BLD: 0 /100 WBCS — SIGNIFICANT CHANGE UP (ref 0–0)
PHOSPHATE SERPL-MCNC: 3.3 MG/DL — SIGNIFICANT CHANGE UP (ref 2.5–4.5)
PLATELET # BLD AUTO: 259 K/UL — SIGNIFICANT CHANGE UP (ref 150–400)
POTASSIUM SERPL-MCNC: 4.2 MMOL/L — SIGNIFICANT CHANGE UP (ref 3.5–5.3)
POTASSIUM SERPL-SCNC: 4.2 MMOL/L — SIGNIFICANT CHANGE UP (ref 3.5–5.3)
RBC # BLD: 3.37 M/UL — LOW (ref 3.8–5.2)
RBC # FLD: 13.9 % — SIGNIFICANT CHANGE UP (ref 10.3–14.5)
SODIUM SERPL-SCNC: 136 MMOL/L — SIGNIFICANT CHANGE UP (ref 135–145)
WBC # BLD: 7.04 K/UL — SIGNIFICANT CHANGE UP (ref 3.8–10.5)
WBC # FLD AUTO: 7.04 K/UL — SIGNIFICANT CHANGE UP (ref 3.8–10.5)

## 2020-06-10 PROCEDURE — 88341 IMHCHEM/IMCYTCHM EA ADD ANTB: CPT

## 2020-06-10 PROCEDURE — 86900 BLOOD TYPING SEROLOGIC ABO: CPT

## 2020-06-10 PROCEDURE — 83735 ASSAY OF MAGNESIUM: CPT

## 2020-06-10 PROCEDURE — 85027 COMPLETE CBC AUTOMATED: CPT

## 2020-06-10 PROCEDURE — 86850 RBC ANTIBODY SCREEN: CPT

## 2020-06-10 PROCEDURE — 88342 IMHCHEM/IMCYTCHM 1ST ANTB: CPT

## 2020-06-10 PROCEDURE — 88309 TISSUE EXAM BY PATHOLOGIST: CPT

## 2020-06-10 PROCEDURE — 80048 BASIC METABOLIC PNL TOTAL CA: CPT

## 2020-06-10 PROCEDURE — C1889: CPT

## 2020-06-10 PROCEDURE — 84100 ASSAY OF PHOSPHORUS: CPT

## 2020-06-10 PROCEDURE — 97162 PT EVAL MOD COMPLEX 30 MIN: CPT

## 2020-06-10 PROCEDURE — 86901 BLOOD TYPING SEROLOGIC RH(D): CPT

## 2020-06-10 RX ORDER — PANTOPRAZOLE SODIUM 20 MG/1
40 TABLET, DELAYED RELEASE ORAL
Refills: 0 | Status: DISCONTINUED | OUTPATIENT
Start: 2020-06-10 | End: 2020-06-10

## 2020-06-10 RX ORDER — OXYCODONE HYDROCHLORIDE 5 MG/1
5 TABLET ORAL
Qty: 8 | Refills: 0
Start: 2020-06-10

## 2020-06-10 RX ORDER — ACETAMINOPHEN 500 MG
650 TABLET ORAL EVERY 6 HOURS
Refills: 0 | Status: DISCONTINUED | OUTPATIENT
Start: 2020-06-10 | End: 2020-06-10

## 2020-06-10 RX ORDER — HYOSCYAMINE SULFATE 0.13 MG
1 TABLET ORAL
Qty: 20 | Refills: 0
Start: 2020-06-10 | End: 2020-06-14

## 2020-06-10 RX ORDER — ACETAMINOPHEN 500 MG
650 TABLET ORAL
Qty: 0 | Refills: 0 | DISCHARGE
Start: 2020-06-10

## 2020-06-10 RX ORDER — OXYCODONE HYDROCHLORIDE 5 MG/1
5 TABLET ORAL
Qty: 100 | Refills: 0
Start: 2020-06-10 | End: 2020-06-14

## 2020-06-10 RX ORDER — OXYCODONE HYDROCHLORIDE 5 MG/1
1 TABLET ORAL
Qty: 8 | Refills: 0
Start: 2020-06-10

## 2020-06-10 RX ADMIN — HEPARIN SODIUM 5000 UNIT(S): 5000 INJECTION INTRAVENOUS; SUBCUTANEOUS at 05:06

## 2020-06-10 RX ADMIN — HEPARIN SODIUM 5000 UNIT(S): 5000 INJECTION INTRAVENOUS; SUBCUTANEOUS at 13:48

## 2020-06-10 RX ADMIN — Medication 400 MILLIGRAM(S): at 05:03

## 2020-06-10 RX ADMIN — Medication 650 MILLIGRAM(S): at 11:55

## 2020-06-10 NOTE — PHYSICAL THERAPY INITIAL EVALUATION ADULT - LEVEL OF INDEPENDENCE: SIT/STAND, REHAB EVAL
"  SUBJECTIVE:   Rk Aldana is a 81 year old male who presents to clinic today for the following health issues:    Acute Illness   Acute illness concerns: cough  Onset: >2 weeks    Fever: no     Chills/Sweats: no     Headache (location?): no     Sinus Pressure:YES- post-nasal drainage    Conjunctivitis:  no    Ear Pain: no    Rhinorrhea: YES- yellow/green    Congestion: YES- nasal and possible chest    Sore Throat: no     Cough: YES-non-productive, productive of green sputum    Throughout day     No sob     Used inhaler for bronchitis in past--not often    Wheeze: no     Decreased Appetite: no    Nausea: no    Vomiting: no    Diarrhea:  no    Dysuria/Freq.: no    Fatigue/Achiness: YES- fatigue    Sick/Strep Exposure: YES     Therapies Tried and outcome: Tylenol, Decongestant, antihistamine     Daughter made patient come in for visit. History of allergies. No history of asthma.    ROS:  ROS otherwise negative    OBJECTIVE:                                                    /66 (BP Location: Right arm, Patient Position: Sitting, Cuff Size: Adult Regular)   Pulse 64   Temp 97.9  F (36.6  C) (Tympanic)   Ht 1.689 m (5' 6.5\")   Wt 91.6 kg (202 lb)   SpO2 97%   BMI 32.12 kg/m    Body mass index is 32.12 kg/m .   GENERAL: alert, no distress  HENT: ear canals- normal; TMs- normal; Nose- normal; Mouth- no ulcers, no lesions  NECK: no tenderness, no adenopathy  RESP: diminished breath sounds throughout; wheezing and prolonged expirations with forced expirations; rales in the lower lobes bilaterally R>L  CV: regular rates and rhythm, normal S1 S2, no S3 or S4 and no murmur, no click or rub    Diagnostic test results:  No results found for this or any previous visit (from the past 24 hour(s)).     ASSESSMENT/PLAN:                                                    (J18.1) Pneumonia of both lower lobes due to infectious organism (H)  (primary encounter diagnosis)  Comment: begin antibiotics. Push fluids. " Albuterol four times daily PRN. Signs for emergent evaluation discussed with patient.  Plan: azithromycin (ZITHROMAX) 250 MG tablet,         albuterol (PROAIR HFA/PROVENTIL HFA/VENTOLIN         HFA) 108 (90 Base) MCG/ACT inhaler            (R06.2) Wheezing  Comment:   Plan:     Silva Hendrickson PA-C  [facility]   independent

## 2020-06-10 NOTE — DISCHARGE NOTE PROVIDER - CARE PROVIDER_API CALL
Aysha Rodriguez  COLON/RECTAL SURGERY  [address] [place] [address]  Phone: [phone]  Fax: [phone]  Follow Up Time: Adam Junior  SURGERY  [address] [facility] [place] [address]  Phone: [phone]  Fax: [phone]  Follow Up Time: 2 weeks

## 2020-06-10 NOTE — DIETITIAN INITIAL EVALUATION ADULT. - ADD RECOMMEND
1)Continue current diet as tolerated. 2) Provided diet education of Gastric Surgery Nutrition Therapy, reinforce as needed.

## 2020-06-10 NOTE — PHYSICAL THERAPY INITIAL EVALUATION ADULT - PERTINENT HX OF CURRENT PROBLEM, REHAB EVAL
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

## 2020-06-10 NOTE — PROGRESS NOTE ADULT - ASSESSMENT
Assessment:   86F POD1 s/p laparoscopic partal gastrectomy for malignant gastrointestinal stromal tumor (GIST) of stomach. Recovering appropriately.     Plan:  - Diet: Clear liquids   - IVF until tolerating PO   - Pain control PRN   - Zofran PRN nausea   - OOB/ambulate / encourage IS   - DVT ppx: heparin sq  - F/U AM labs    [facility] Surgery   [phone]

## 2020-06-10 NOTE — PROGRESS NOTE ADULT - ATTENDING COMMENTS
I have seen and evaluated the patient and discussed the relevant clinical findings and plan with the surgical housestaff and fellow.  Agree with the above documentation with addenda as noted.     Doing well, tolerating clears  Abdomen soft, incisions clean and dry    Advance to soft diet  Anticipate discharge home later today    Aadm Junior MD

## 2020-06-10 NOTE — DIETITIAN INITIAL EVALUATION ADULT. - OTHER INFO
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 s/p laparoscopic partal gastrectomy for malignant gastrointestinal stromal tumor (GIST) of stomach.    Pt report fair to good PO intake and appetite for the last few months. Has been eating smaller more frequent meals due to feeling full. Confirms NKFA. Pt denies chewing/swallowing difficulty, nausea, vomiting, diarrhea, constipation. Takes multivitamin. Follows regular diet with varied foods.     Weights: pt reports UBW as ~ 145-150lbs Jan/Feb 2020, reports some weight loss due to multiple tests, procedures and doctors visits, has also been eating smaller portions, remains physically active. Pt noted with weight of 159lbs (9/18/19) per [facility].    Pt tolerated clear liquid try this morning, no acute GI distress, passing gas, No BM. Pt amendable to diet education. Review soft diet recommendations, avoiding no carbonated beverages/straws, consuming 5-6 small meals/day and  water from meals if feeling full. Discussed importance of adequate protein intake. Patient with no nutrition-related questions at this time. Made aware RD remains available as needed.

## 2020-06-10 NOTE — DISCHARGE NOTE NURSING/CASE MANAGEMENT/SOCIAL WORK - PATIENT PORTAL LINK FT
You can access the FollowMyHealth Patient Portal offered by [facility] by registering at the following website: [email or web address]. By joining [employer]’s FollowMyHealth portal, you will also be able to view your health information using other applications (apps) compatible with our system.

## 2020-06-10 NOTE — DIETITIAN INITIAL EVALUATION ADULT. - PHYSICAL APPEARANCE
well nourished/other (specify) Ht: 66, Wt: 136lbs, BMI: 22.kg/m2, IBW: 130lbs +/- 10%, %IBW: 105%  Edema: none Skin: free of pressure injuries per nursing flow sheets

## 2020-06-10 NOTE — PHYSICAL THERAPY INITIAL EVALUATION ADULT - ADDITIONAL COMMENTS
Pt lives alone in elevator access apartment, previously independent with all functional mobility. Pt states her daughter will be staying with her upon d/c home.

## 2020-06-10 NOTE — DISCHARGE NOTE PROVIDER - CARE PROVIDERS DIRECT ADDRESSES
[email or web address]

## 2020-06-10 NOTE — DISCHARGE NOTE PROVIDER - NSDCCPCAREPLAN_GEN_ALL_CORE_FT
PRINCIPAL DISCHARGE DIAGNOSIS  Diagnosis: Malignant neoplasm of connective or other soft tissue  Assessment and Plan of Treatment: Patient underwent resection of GIST on 6/9. Met all post op milestones. On day of discharge, the patient was tolerating diet, ambulating well and pain controlled.      SECONDARY DISCHARGE DIAGNOSES  Diagnosis: Hypertension  Assessment and Plan of Treatment: Remained wnl. f/u putpatient.

## 2020-06-10 NOTE — DISCHARGE NOTE PROVIDER - HOSPITAL COURSE
87 y/o female c/o abdominal pain, originally seen GI for consultation. Patient had a CT done that showed + mass. 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 In the PACU, the patients' pain was controlled, vitals stable, tolerating PO, and voiding appropriately.  The patient was transferred to the surgical floor in stable condition. Patient met all postoperative milestones and no acute events over night. On day of discharge, the patient was tolerating diet, ambulating well and pain controlled.

## 2020-06-10 NOTE — DISCHARGE NOTE PROVIDER - NSDCMRMEDTOKEN_GEN_ALL_CORE_FT
oxyCODONE 5 mg oral capsule: 1 cap(s) orally every 6 hours MDD:4 oxyCODONE 5 mg/5 mL oral solution: 5 milliliter(s) orally every 6 hours, As needed, Severe Pain (7 - 10) MDD:4 acetaminophen 160 mg/5 mL oral suspension: 650 milliliter(s) orally every 6 hours  hyoscyamine 0.125 mg sublingual tablet: 1 tab(s) sublingual every 6 hours, As Needed   oxyCODONE 5 mg/5 mL oral solution: 5 milliliter(s) orally every 6 hours, As needed, Severe Pain (7 - 10) MDD:4

## 2020-06-10 NOTE — DISCHARGE NOTE PROVIDER - NSDCCPTREATMENT_GEN_ALL_CORE_FT
PRINCIPAL PROCEDURE  Procedure: Laparoscopic partial gastrectomy  Findings and Treatment: 3 cm well circumscribed mass of the anterior gastric fundus      SECONDARY PROCEDURE  Procedure: EGD  Findings and Treatment:

## 2020-06-10 NOTE — DISCHARGE NOTE PROVIDER - NSDCFUADDINST_GEN_ALL_CORE_FT
Activity- No heavy lifting or straining over 15 lbs for the next two weeks;  Driving- Please do not drive until your pain is well controlled and you do not need to take pain medications.  You may shower-Do not submerge or scrub incision sites.  Please pat dry incisions/dressings.  Leave the white steri strips in place, they will fall off on their own in approximately 5-7 days.

## 2020-06-12 LAB — SURGICAL PATHOLOGY STUDY: SIGNIFICANT CHANGE UP

## 2020-06-26 ENCOUNTER — APPOINTMENT (OUTPATIENT)
Dept: SURGERY | Facility: CLINIC | Age: 85
End: 2020-06-26
Payer: MEDICARE

## 2020-06-26 VITALS
OXYGEN SATURATION: 96 % | HEART RATE: 77 BPM | BODY MASS INDEX: 20.4 KG/M2 | DIASTOLIC BLOOD PRESSURE: 74 MMHG | RESPIRATION RATE: 18 BRPM | TEMPERATURE: 98.3 F | HEIGHT: 67 IN | WEIGHT: 130 LBS | SYSTOLIC BLOOD PRESSURE: 135 MMHG

## 2020-06-26 DIAGNOSIS — R10.12 LEFT UPPER QUADRANT PAIN: ICD-10-CM

## 2020-06-26 PROCEDURE — 99213 OFFICE O/P EST LOW 20 MIN: CPT

## 2020-06-26 NOTE — PLAN
[FreeTextEntry1] : Surveillance guidelines suggest follow-up CT in 3 to 6 months.\par \par Referred patient to Dr. Rodriguez for colonoscopy.

## 2020-06-26 NOTE — PHYSICAL EXAM
[Normal] : pharynx is unremarkable, moist mucus membrane, no oral lesions [de-identified] : Soft, nondistended, incisions well-healed without erythema or drainage.  Mildly tender to deep palpation left upper quadrant without guarding or rebound.  No palpable masses. [de-identified] : Joint pain

## 2020-06-26 NOTE — HISTORY OF PRESENT ILLNESS
[de-identified] : Abram Delgado is an 86 year old female here for a post operative visit.  S/P Laparoscopic partial gastrectomy on 06/09/20  for gastrointestinal stromal tumor (GIST) of stomach.\par \par Postoperatively, the patient has been doing very well.  She reports no dysphagia and is tolerating a regular diet.  She has not had any fevers, chills, or malaise.  She notes her incisions are healing well without any drainage.\par \par The patient does report that for several months prior to this surgery, she has been experiencing persistent left-sided abdominal pain, worst postprandially, associated with borborygmus.  She says she has seen her gastroenterologist but was not told she needed colonoscopy.  She says her previous colonoscopy was approximately 4 years ago and she did have a polyp found at that time.  She reports normal bowel movements with mild constipation, no blood per rectum, no bloating.

## 2020-06-26 NOTE — ASSESSMENT
[FreeTextEntry1] : 86-year-old female recovering well status post laparoscopic resection of GIST.\par \par She has additional complaint of several month long history of left upper quadrant abdominal pain that has been present since before the surgery.

## 2020-07-01 ENCOUNTER — APPOINTMENT (OUTPATIENT)
Dept: SURGERY | Facility: CLINIC | Age: 85
End: 2020-07-01
Payer: MEDICARE

## 2020-07-01 VITALS
HEART RATE: 64 BPM | DIASTOLIC BLOOD PRESSURE: 75 MMHG | OXYGEN SATURATION: 97 % | SYSTOLIC BLOOD PRESSURE: 158 MMHG | TEMPERATURE: 98.6 F | RESPIRATION RATE: 16 BRPM

## 2020-07-01 DIAGNOSIS — Z09 ENCOUNTER FOR FOLLOW-UP EXAMINATION AFTER COMPLETED TREATMENT FOR CONDITIONS OTHER THAN MALIGNANT NEOPLASM: ICD-10-CM

## 2020-07-01 PROCEDURE — 99213 OFFICE O/P EST LOW 20 MIN: CPT

## 2020-07-01 RX ORDER — NEOMYCIN SULFATE, POLYMYXIN B SULFATE AND HYDROCORTISONE 3.5; 10000; 1 MG/ML; [IU]/ML; MG/ML
3.5-10000-1 SOLUTION AURICULAR (OTIC)
Qty: 10 | Refills: 0 | Status: DISCONTINUED | COMMUNITY
Start: 2019-06-06 | End: 2020-07-01

## 2020-07-01 RX ORDER — ATENOLOL 25 MG/1
25 TABLET ORAL
Qty: 90 | Refills: 0 | Status: DISCONTINUED | COMMUNITY
Start: 2018-10-22 | End: 2020-07-01

## 2020-07-01 RX ORDER — PNV NO.95/FERROUS FUM/FOLIC AC 28MG-0.8MG
TABLET ORAL
Refills: 0 | Status: ACTIVE | COMMUNITY

## 2020-07-01 RX ORDER — CITALOPRAM HYDROBROMIDE 20 MG/1
20 TABLET, FILM COATED ORAL
Qty: 90 | Refills: 0 | Status: DISCONTINUED | COMMUNITY
Start: 2019-03-01 | End: 2020-07-01

## 2020-07-04 PROBLEM — Z09 POSTOPERATIVE EXAMINATION: Status: ACTIVE | Noted: 2020-06-26

## 2020-07-04 NOTE — CONSULT LETTER
[Dear  ___] : Dear ~JORDEN, [Courtesy Letter:] : I had the pleasure of seeing your patient, [unfilled], in my office today. [Please see my note below.] : Please see my note below. [Consult Closing:] : Thank you very much for allowing me to participate in the care of this patient.  If you have any questions, please do not hesitate to contact me. [Sincerely,] : Sincerely, [FreeTextEntry3] : Aysha Rodriguez M.D., F.A.C.S., F.TRISH.S.C.R.S.\par Assistant Professor of Surgery\par [employer] [facility]\par \par  [FreeTextEntry2] : Dr Jim Burkett

## 2020-07-04 NOTE — PHYSICAL EXAM
[FreeTextEntry1] : This is an 86-year-old well-developed female in no apparent distress.\par \par Cardiac - regular rate and rhythm.\par \par Abdomen soft, nontender, nondistended, no masses. No hepatosplenomegaly. Port scar incisions have healed well. No hernias. \par \par Neuro-cranial nerves grossly intact. Normal gait.\par \par Psychiatric-oriented to time place and person. Good understanding of conversation.\par \par \par

## 2020-07-04 NOTE — HISTORY OF PRESENT ILLNESS
[FreeTextEntry1] : Asha Delgado is an 85 y/o female here for a  follow up visit for left lateral abdominal pain. 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 She saw Dr Junior on 6/29 and has had an uneventful recovery.  Plan is for CT surveillance in 3 to 6 months.\par However she has had persistent left sided abdominal pain for several months. Recent CT demonstrated only diverticulosis in the area. Last Colonoscopy from 05/19/16 demonstrated one 3 mm polyp in the ascending colon. Diverticulosis in the sigmoid colon, external hemorrhoids. Pathology: Sessile serrated adenoma. \par Patient has soft 2 BMs daily. Denies N/V fever, chills. Tolerating LRD. Denies the use of laxatives or analgesics. She reports the pain is significant.  Reports weight loss over the last several months.  The left-sided pain has not changed after the excision of the GIST.  She is inquiring about me doing a colonoscopy for her.

## 2020-07-04 NOTE — ASSESSMENT
[FreeTextEntry1] : 86-year-old female with left-sided abdominal pain and CT showing diverticulosis.  I recommended a diagnostic colonoscopy as the next step in her work-up. I discussed the details, risks, benefits and alternatives of the procedure. \par She will schedule the procedure with my office.\par

## 2020-08-03 DIAGNOSIS — Z01.818 ENCOUNTER FOR OTHER PREPROCEDURAL EXAMINATION: ICD-10-CM

## 2020-08-04 ENCOUNTER — APPOINTMENT (OUTPATIENT)
Dept: DISASTER EMERGENCY | Facility: CLINIC | Age: 85
End: 2020-08-04

## 2020-08-07 ENCOUNTER — APPOINTMENT (OUTPATIENT)
Dept: SURGERY | Facility: HOSPITAL | Age: 85
End: 2020-08-07

## 2020-09-01 ENCOUNTER — APPOINTMENT (OUTPATIENT)
Dept: DISASTER EMERGENCY | Facility: CLINIC | Age: 85
End: 2020-09-01

## 2020-09-02 LAB — SARS-COV-2 N GENE NPH QL NAA+PROBE: NOT DETECTED

## 2020-09-04 ENCOUNTER — RESULT REVIEW (OUTPATIENT)
Age: 85
End: 2020-09-04

## 2020-09-04 ENCOUNTER — OUTPATIENT (OUTPATIENT)
Dept: OUTPATIENT SERVICES | Facility: HOSPITAL | Age: 85
LOS: 1 days | End: 2020-09-04
Payer: MEDICARE

## 2020-09-04 ENCOUNTER — APPOINTMENT (OUTPATIENT)
Dept: SURGERY | Facility: HOSPITAL | Age: 85
End: 2020-09-04
Payer: MEDICARE

## 2020-09-04 VITALS
TEMPERATURE: 97 F | RESPIRATION RATE: 11 BRPM | SYSTOLIC BLOOD PRESSURE: 151 MMHG | HEIGHT: 64 IN | DIASTOLIC BLOOD PRESSURE: 77 MMHG | WEIGHT: 134.92 LBS | OXYGEN SATURATION: 99 % | HEART RATE: 66 BPM

## 2020-09-04 VITALS
OXYGEN SATURATION: 98 % | HEART RATE: 59 BPM | SYSTOLIC BLOOD PRESSURE: 122 MMHG | DIASTOLIC BLOOD PRESSURE: 65 MMHG | RESPIRATION RATE: 13 BRPM

## 2020-09-04 DIAGNOSIS — R10.9 UNSPECIFIED ABDOMINAL PAIN: ICD-10-CM

## 2020-09-04 DIAGNOSIS — Z98.890 OTHER SPECIFIED POSTPROCEDURAL STATES: Chronic | ICD-10-CM

## 2020-09-04 DIAGNOSIS — Z96.659 PRESENCE OF UNSPECIFIED ARTIFICIAL KNEE JOINT: Chronic | ICD-10-CM

## 2020-09-04 PROCEDURE — 88305 TISSUE EXAM BY PATHOLOGIST: CPT

## 2020-09-04 PROCEDURE — 88305 TISSUE EXAM BY PATHOLOGIST: CPT | Mod: 26

## 2020-09-04 PROCEDURE — 45380 COLONOSCOPY AND BIOPSY: CPT

## 2020-09-04 RX ORDER — SODIUM CHLORIDE 9 MG/ML
1000 INJECTION, SOLUTION INTRAVENOUS
Refills: 0 | Status: DISCONTINUED | OUTPATIENT
Start: 2020-09-04 | End: 2020-09-04

## 2020-09-04 RX ORDER — SODIUM CHLORIDE 9 MG/ML
1000 INJECTION INTRAMUSCULAR; INTRAVENOUS; SUBCUTANEOUS
Refills: 0 | Status: DISCONTINUED | OUTPATIENT
Start: 2020-09-04 | End: 2020-09-18

## 2020-09-04 RX ADMIN — SODIUM CHLORIDE 30 MILLILITER(S): 9 INJECTION INTRAMUSCULAR; INTRAVENOUS; SUBCUTANEOUS at 08:02

## 2020-09-04 NOTE — PRE PROCEDURE NOTE - PRE PROCEDURE EVALUATION
Attending Physician:     DEYANIRA Rodriguez MD                       Procedure: Colonoscopy    Indication for Procedure: Left lateral pain  ________________________________________________________  PAST MEDICAL & SURGICAL HISTORY:  Osteoarthritis  Hypertension: on no med  S/P ovarian cystectomy  History of knee replacement: right, 2013    ALLERGIES:  No Known Allergies    HOME MEDICATIONS:    AICD/PPM: [ ] yes   [ x] no    PERTINENT LAB DATA:                      PHYSICAL EXAMINATION:    Height (cm): 162.56  Weight (kg): 61.2  BMI (kg/m2): 23.2  BSA (m2): 1.65T(C): 35.9  HR: 66  BP: 151/77  RR: 11  SpO2: 99%    Constitutional: NAD  HEENT: PERRLA, EOMI,    Neck:  No JVD  Respiratory: CTAB/L  Cardiovascular: S1 and S2  Gastrointestinal: BS+, soft, NT/ND  Extremities: No peripheral edema  Neurological: A/O x 3, no focal deficits  Psychiatric: Normal mood, normal affect  Skin: No rashes    ASA Class: I [ ]  II [ x]  III [ ]  IV [ ]    COMMENTS:    The patient is a suitable candidate for the planned procedure unless box checked [x ]  No, explain:

## 2020-09-09 LAB — SURGICAL PATHOLOGY STUDY: SIGNIFICANT CHANGE UP

## 2020-09-10 DIAGNOSIS — Z85.831 PERSONAL HISTORY OF MALIGNANT NEOPLASM OF SOFT TISSUE: ICD-10-CM

## 2020-09-10 DIAGNOSIS — C49.A2 GASTROINTESTINAL STROMAL TUMOR OF STOMACH: ICD-10-CM

## 2020-10-01 ENCOUNTER — OUTPATIENT (OUTPATIENT)
Dept: OUTPATIENT SERVICES | Facility: HOSPITAL | Age: 85
LOS: 1 days | End: 2020-10-01
Payer: MEDICARE

## 2020-10-01 ENCOUNTER — APPOINTMENT (OUTPATIENT)
Dept: CT IMAGING | Facility: IMAGING CENTER | Age: 85
End: 2020-10-01
Payer: MEDICARE

## 2020-10-01 DIAGNOSIS — Z98.890 OTHER SPECIFIED POSTPROCEDURAL STATES: Chronic | ICD-10-CM

## 2020-10-01 DIAGNOSIS — Z00.8 ENCOUNTER FOR OTHER GENERAL EXAMINATION: ICD-10-CM

## 2020-10-01 DIAGNOSIS — C49.A2 GASTROINTESTINAL STROMAL TUMOR OF STOMACH: ICD-10-CM

## 2020-10-01 DIAGNOSIS — Z96.659 PRESENCE OF UNSPECIFIED ARTIFICIAL KNEE JOINT: Chronic | ICD-10-CM

## 2020-10-01 PROCEDURE — 74160 CT ABDOMEN W/CONTRAST: CPT | Mod: 26

## 2020-10-01 PROCEDURE — 74160 CT ABDOMEN W/CONTRAST: CPT

## 2020-10-01 PROCEDURE — 82565 ASSAY OF CREATININE: CPT

## 2021-01-11 ENCOUNTER — APPOINTMENT (OUTPATIENT)
Dept: SURGERY | Facility: CLINIC | Age: 86
End: 2021-01-11
Payer: MEDICARE

## 2021-01-11 VITALS
OXYGEN SATURATION: 99 % | SYSTOLIC BLOOD PRESSURE: 165 MMHG | RESPIRATION RATE: 15 BRPM | HEART RATE: 76 BPM | TEMPERATURE: 97 F | DIASTOLIC BLOOD PRESSURE: 91 MMHG

## 2021-01-11 DIAGNOSIS — R10.9 UNSPECIFIED ABDOMINAL PAIN: ICD-10-CM

## 2021-01-11 PROCEDURE — 99072 ADDL SUPL MATRL&STAF TM PHE: CPT

## 2021-01-11 PROCEDURE — 99214 OFFICE O/P EST MOD 30 MIN: CPT

## 2021-01-11 NOTE — CONSULT LETTER
[Dear  ___] : Dear  [unfilled], [Consult Letter:] : I had the pleasure of evaluating your patient, [unfilled]. [Please see my note below.] : Please see my note below. [Consult Closing:] : Thank you very much for allowing me to participate in the care of this patient.  If you have any questions, please do not hesitate to contact me. [Sincerely,] : Sincerely, [FreeTextEntry2] : Dr. Estuardo Dawson [FreeTextEntry3] : Adam Junior MD, FACS, FASMBS\par [facility]\par [address]\[place] [address]\par tel. [phone]\par fax [phone]\par  \par

## 2021-01-11 NOTE — ASSESSMENT
[FreeTextEntry1] : 87-year-old female status post laparoscopic partial gastrectomy for resection of GIST in June 2020.\par Patient with chronic left lower quadrant abdominal pain and bloating that has persisted since before her surgery. This is not surgical in nature and she does not have any palpable masses or hernias on exam.\par Patient also with new intermittent sharp left-sided chest pain.

## 2021-01-11 NOTE — HISTORY OF PRESENT ILLNESS
[de-identified] : Sandy is an 86 y/o female here for evaluation of abdominal pain. Patient is s/p laparoscopic partial gastrectomy and upper endoscopy on 6/09/20 for gastrointestinal stromal tumor. \par The patient reports she is having the same pain she had preoperatively, described as left-sided and left lower quadrant abdominal pain and persistent bloating. She states this is usually worse after eating. She continues to have intermittent constipation, and she reports her stools are thinner than usual. She states she did have a colonoscopy last September, which demonstrated moderate sigmoid diverticulosis, and impacted diverticulum, and a 2 mm polyp in the distal sigmoid colon which was resected.\par \par The patient reports bloating and belching. She does not report any dysphagia or nausea. She is tolerating regular diet.\par \par She also reports new, intermittent left-sided chest pain. She points to one spot over the left breast. She does not report any associated palpitations, shortness of breath, or reduced exercise tolerance.

## 2021-01-11 NOTE — PHYSICAL EXAM
[de-identified] : Well-appearing, no acute distress, appears stated age [de-identified] : Sclerae anicteric [de-identified] : Soft, nondistended, mildly tender to palpation left lower quadrant without guarding or rebound. Incisions are well-healed. No palpable masses. No palpable inguinal hernia. There is no tenderness over the groin areas.

## 2021-01-11 NOTE — PLAN
[FreeTextEntry1] : CT abdomen and pelvis ordered\par Recommended follow-up with her gastroenterologist\par Recommended follow-up with her primary care doctor for evaluation of chest pain.

## 2021-01-12 ENCOUNTER — OUTPATIENT (OUTPATIENT)
Dept: OUTPATIENT SERVICES | Facility: HOSPITAL | Age: 86
LOS: 1 days | End: 2021-01-12
Payer: MEDICARE

## 2021-01-12 ENCOUNTER — APPOINTMENT (OUTPATIENT)
Dept: CT IMAGING | Facility: CLINIC | Age: 86
End: 2021-01-12
Payer: MEDICARE

## 2021-01-12 DIAGNOSIS — Z98.890 OTHER SPECIFIED POSTPROCEDURAL STATES: Chronic | ICD-10-CM

## 2021-01-12 DIAGNOSIS — Z96.659 PRESENCE OF UNSPECIFIED ARTIFICIAL KNEE JOINT: Chronic | ICD-10-CM

## 2021-01-12 DIAGNOSIS — R10.9 UNSPECIFIED ABDOMINAL PAIN: ICD-10-CM

## 2021-01-12 PROCEDURE — 74177 CT ABD & PELVIS W/CONTRAST: CPT | Mod: 26

## 2021-01-12 PROCEDURE — 74177 CT ABD & PELVIS W/CONTRAST: CPT

## 2021-01-12 PROCEDURE — 82565 ASSAY OF CREATININE: CPT

## 2021-01-14 ENCOUNTER — NON-APPOINTMENT (OUTPATIENT)
Age: 86
End: 2021-01-14

## 2021-05-10 ENCOUNTER — APPOINTMENT (OUTPATIENT)
Dept: ORTHOPEDIC SURGERY | Facility: CLINIC | Age: 86
End: 2021-05-10

## 2021-05-26 ENCOUNTER — APPOINTMENT (OUTPATIENT)
Dept: ORTHOPEDIC SURGERY | Facility: CLINIC | Age: 86
End: 2021-05-26
Payer: MEDICARE

## 2021-05-26 VITALS — TEMPERATURE: 97.7 F | BODY MASS INDEX: 20.4 KG/M2 | HEIGHT: 67 IN | WEIGHT: 130 LBS

## 2021-05-26 DIAGNOSIS — M17.12 UNILATERAL PRIMARY OSTEOARTHRITIS, LEFT KNEE: ICD-10-CM

## 2021-05-26 PROCEDURE — 73564 X-RAY EXAM KNEE 4 OR MORE: CPT | Mod: LT

## 2021-05-26 PROCEDURE — 99215 OFFICE O/P EST HI 40 MIN: CPT | Mod: 25

## 2021-05-26 PROCEDURE — 20610 DRAIN/INJ JOINT/BURSA W/O US: CPT | Mod: LT

## 2021-05-26 PROCEDURE — 99072 ADDL SUPL MATRL&STAF TM PHE: CPT

## 2021-05-26 PROCEDURE — 73562 X-RAY EXAM OF KNEE 3: CPT | Mod: RT

## 2021-05-26 RX ADMIN — METHYLPREDNISOLONE ACETATE 2 MG/ML: 40 INJECTION, SUSPENSION INTRALESIONAL; INTRAMUSCULAR; INTRASYNOVIAL; SOFT TISSUE at 00:00

## 2021-05-26 RX ADMIN — LIDOCAINE HYDROCHLORIDE 3 %: 10 INJECTION, SOLUTION INFILTRATION; PERINEURAL at 00:00

## 2021-05-28 RX ORDER — METHYLPRED ACET/NACL,ISO-OS/PF 40 MG/ML
40 VIAL (ML) INJECTION
Qty: 1 | Refills: 0 | Status: COMPLETED | OUTPATIENT
Start: 2021-05-26

## 2021-05-28 RX ORDER — LIDOCAINE HYDROCHLORIDE 10 MG/ML
1 INJECTION, SOLUTION INFILTRATION; PERINEURAL
Refills: 0 | Status: COMPLETED | OUTPATIENT
Start: 2021-05-26

## 2022-01-13 NOTE — ED ADULT NURSE NOTE - DISCHARGE DATE/TIME
Diabetes Support Resources:  1. More proteins and fibers in diet.      2. Continue the Metformin  mg take 2 tablets twice per day    3. Start Trulicity 4.5 mg weekly    4. Start exercising such as walking    5. Follow up with Dr. Lomeli, this month    A1C after 4/12/2022     Bring blood glucose meter and logbook with you to all doctor and follow-up appointments.    Diabetes Education Telephone Visit Follow-up:    We realize your time is valuable and your health is important! We offer a convenient Telephone Visit follow up! It s a quick way to check in for a medication dose adjustment without having to come back to clinic as soon.    Telephone Visits are often covered by insurance. Please check with your insurance plan to see if this type of visit is covered. If not, the cost is less expensive than an office visit:      Up to 10 minutes (Code 30847): $30    11-20 minutes (Code 10762): $59    More than 20 minutes (Code 37423): $85    Talk with your Diabetes Educator if you want to learn more.      [facility]:  For Your Diabetes Education and Nutrition Appointments Call:  [phone]   For Diabetes Education or Nutrition Related Questions:   Phone: [phone]  Send ePub Direct Message   If you need a medication refill please contact your pharmacy. Please allow 3 business days for your refills to be completed.    
24-Feb-2020 11:28

## 2022-02-22 ENCOUNTER — NON-APPOINTMENT (OUTPATIENT)
Age: 87
End: 2022-02-22

## 2022-02-22 ENCOUNTER — APPOINTMENT (OUTPATIENT)
Dept: CARDIOLOGY | Facility: CLINIC | Age: 87
End: 2022-02-22
Payer: MEDICARE

## 2022-02-22 VITALS — DIASTOLIC BLOOD PRESSURE: 63 MMHG | SYSTOLIC BLOOD PRESSURE: 141 MMHG

## 2022-02-22 VITALS
OXYGEN SATURATION: 99 % | TEMPERATURE: 97.7 F | HEART RATE: 61 BPM | RESPIRATION RATE: 15 BRPM | DIASTOLIC BLOOD PRESSURE: 82 MMHG | WEIGHT: 130 LBS | SYSTOLIC BLOOD PRESSURE: 172 MMHG | BODY MASS INDEX: 20.36 KG/M2

## 2022-02-22 DIAGNOSIS — I10 ESSENTIAL (PRIMARY) HYPERTENSION: ICD-10-CM

## 2022-02-22 DIAGNOSIS — R07.9 CHEST PAIN, UNSPECIFIED: ICD-10-CM

## 2022-02-22 DIAGNOSIS — N39.0 URINARY TRACT INFECTION, SITE NOT SPECIFIED: ICD-10-CM

## 2022-02-22 PROCEDURE — 93306 TTE W/DOPPLER COMPLETE: CPT

## 2022-02-22 PROCEDURE — 99204 OFFICE O/P NEW MOD 45 MIN: CPT

## 2022-02-22 PROCEDURE — 93000 ELECTROCARDIOGRAM COMPLETE: CPT | Mod: 59

## 2022-02-22 PROCEDURE — 93015 CV STRESS TEST SUPVJ I&R: CPT

## 2022-02-22 RX ORDER — FOLIC ACID 1 MG/1
1 TABLET ORAL
Refills: 0 | Status: ACTIVE | COMMUNITY

## 2022-02-22 RX ORDER — MULTIVIT-MIN/FA/LYCOPEN/LUTEIN .4-300-25
TABLET ORAL
Refills: 0 | Status: ACTIVE | COMMUNITY

## 2022-02-22 RX ORDER — ESCITALOPRAM OXALATE 10 MG/1
10 TABLET ORAL
Refills: 0 | Status: ACTIVE | COMMUNITY

## 2022-02-22 RX ORDER — NITROFURANTOIN MACROCRYSTALS 100 MG/1
100 CAPSULE ORAL TWICE DAILY
Refills: 0 | Status: ACTIVE | COMMUNITY

## 2022-02-22 NOTE — CARDIOLOGY SUMMARY
[___] : [unfilled] [de-identified] : 2- stress echocardiogram with  EF 70%, negative  for ischemia,  TTE with LVH\par 2-, repeating the test, result pending.

## 2022-02-22 NOTE — DISCUSSION/SUMMARY
[Stress Echocardiogram] : stress echocardiogram [With Me] : with me [___ Month(s)] : in [unfilled] month(s) [Hypertension] : hypertension [Stable] : stable [None] : There are no changes in medication management [Exercise Regimen] : an exercise regimen [Dietary Modification] : dietary modification [Acetaminophen Avoidance] : acetaminophen  avoidance [Low Sodium Diet] : low sodium diet [NSAIDs Avoidance] : non-steroidal anti-inflammatory drugs avoidance [Patient] : the patient [Family] : the patient's family [Minutes Spent: ___] : for [unfilled] ~Uminutes [de-identified] : atypical nature for angina. [de-identified] : or stress pect. [de-identified] : pending the stress echocardiogram. [de-identified] : reactive hypertension

## 2022-02-22 NOTE — PHYSICAL EXAM
[General Appearance - Well Developed] : well developed [Normal Appearance] : normal appearance [Well Groomed] : well groomed [General Appearance - Well Nourished] : well nourished [No Deformities] : no deformities [General Appearance - In No Acute Distress] : no acute distress [Normal Conjunctiva] : the conjunctiva exhibited no abnormalities [Eyelids - No Xanthelasma] : the eyelids demonstrated no xanthelasmas [Normal Oral Mucosa] : normal oral mucosa [No Oral Pallor] : no oral pallor [No Oral Cyanosis] : no oral cyanosis [Normal Jugular Venous A Waves Present] : normal jugular venous A waves present [Normal Jugular Venous V Waves Present] : normal jugular venous V waves present [No Jugular Venous Clemons A Waves] : no jugular venous clemons A waves [Heart Rate And Rhythm] : heart rate and rhythm were normal [Heart Sounds] : normal S1 and S2 [Murmurs] : no murmurs present [Arterial Pulses Normal] : the arterial pulses were normal [Edema] : no peripheral edema present [Veins - Varicosity Changes] : no varicosital changes were noted in the lower extremities [Respiration, Rhythm And Depth] : normal respiratory rhythm and effort [Exaggerated Use Of Accessory Muscles For Inspiration] : no accessory muscle use [Auscultation Breath Sounds / Voice Sounds] : lungs were clear to auscultation bilaterally [Chest Palpation] : palpation of the chest revealed no abnormalities [Lungs Percussion] : the lungs were normal to percussion [Bowel Sounds] : normal bowel sounds [Abdomen Soft] : soft [Abdomen Tenderness] : non-tender [Abdomen Mass (___ Cm)] : no abdominal mass palpated [Abdomen Hernia] : no hernia was discovered [Abnormal Walk] : normal gait [Gait - Sufficient For Exercise Testing] : the gait was sufficient for exercise testing [Nail Clubbing] : no clubbing of the fingernails [Cyanosis, Localized] : no localized cyanosis [Petechial Hemorrhages (___cm)] : no petechial hemorrhages [Skin Turgor] : normal skin turgor [] : no rash [No Venous Stasis] : no venous stasis [Skin Lesions] : no skin lesions [No Skin Ulcers] : no skin ulcer [No Xanthoma] : no  xanthoma was observed [Oriented To Time, Place, And Person] : oriented to person, place, and time [Affect] : the affect was normal [Mood] : the mood was normal [No Anxiety] : not feeling anxious [Normal Radial B/L] : normal radial B/L [Normal PT B/L] : normal PT B/L [Normal DP B/L] : normal DP B/L [FreeTextEntry1] : left shoulder with operational scar, Tender spot over the left infraaxilla area.

## 2022-02-22 NOTE — REVIEW OF SYSTEMS
[Fever] : no fever [Headache] : no headache [Chills] : no chills [Feeling Fatigued] : not feeling fatigued [Blurry Vision] : no blurred vision [Seeing Double (Diplopia)] : no diplopia [Eye Pain] : no eye pain [Earache] : no earache [Discharge From Ears] : no discharge from the ears [Hearing Loss] : no hearing loss [Mouth Sores] : no mouth sores [Sore Throat] : no sore throat [Sinus Pressure] : no sinus pressure [Tinnitus] : no tinnitus [Vertigo] : no vertigo [SOB] : no shortness of breath [Dyspnea on exertion] : not dyspnea during exertion [Chest Discomfort] : chest discomfort [Lower Ext Edema] : no extremity edema [Leg Claudication] : no intermittent leg claudication [Palpitations] : no palpitations [Orthopnea] : no orthopnea [PND] : no PND [Syncope] : no syncope [Cough] : no cough [Wheezing] : no wheezing [Coughing Up Blood] : no hemoptysis [Snoring] : no snoring [Abdominal Pain] : no abdominal pain [Nausea] : no nausea [Vomiting] : no vomiting [Heartburn] : no heartburn [Change in Appetite] : no change in appetite [Change In The Stool] : no change in stool [Dysphagia] : no dysphagia [Diarrhea] : diarrhea [Constipation] : no constipation [Blood in Stool] : no blood in stool [Dysuria] : no dysuria [Pelvic Pain] : no pelvic pain [Abn Vaginal Bleeding] : no unexplained vaginal bleeding [Joint Pain] : no joint pain [Joint Swelling] : no joint swelling [Joint Stiffness] : no joint stiffness [Muscle Cramps] : no muscle cramps [Myalgia] : no myalgia [Rash] : no rash [Itching] : no itching [Change In Color Of Skin] : change in skin color [Skin Lesions] : no skin lesions [Telangiectasias] : no telangiectasias [Dizziness] : no dizziness [Tremor] : no tremor was seen [Numbness (Hypoesthesia)] : no numbness [Convulsions] : no convulsions [Tingling (Paresthesia)] : no tingling [Weakness] : no weakness [Limb Weakness (Paresis)] : no limb weakness (Paresis) [Speech Disturbance] : no speech disturbance [Confusion] : no confusion was observed [Memory Lapses Or Loss] : no memory lapses or loss [Depression] : no depression [Anxiety] : no anxiety [Under Stress] : not under stress [Suicidal] : not suicidal [Easy Bleeding] : no tendency for easy bleeding [Swollen Glands] : no swollen glands [Easy Bruising] : no tendency for easy bruising [Negative] : Heme/Lymph

## 2022-02-22 NOTE — HISTORY OF PRESENT ILLNESS
[FreeTextEntry1] : Patient, an 88 year old female has long history of left infra-axillary pressure, aching not close related to the exertion or effort. It did not associated with shortness of breath. She been in good health otherwise. She also has discomfort over the left back and tingling sensation of left arm  all day long for the last few weeks. She was anxious and was noted with  hypertension. She is not on medication. \par \par At ordinary condition, she has no chest pain, no shortness of breath, dizziness or palpitations.\par

## 2022-04-08 ENCOUNTER — APPOINTMENT (OUTPATIENT)
Dept: ORTHOPEDIC SURGERY | Facility: CLINIC | Age: 87
End: 2022-04-08
Payer: MEDICARE

## 2022-04-08 ENCOUNTER — NON-APPOINTMENT (OUTPATIENT)
Age: 87
End: 2022-04-08

## 2022-04-08 VITALS — HEART RATE: 59 BPM | SYSTOLIC BLOOD PRESSURE: 148 MMHG | DIASTOLIC BLOOD PRESSURE: 76 MMHG

## 2022-04-08 DIAGNOSIS — M17.12 UNILATERAL PRIMARY OSTEOARTHRITIS, LEFT KNEE: ICD-10-CM

## 2022-04-08 PROCEDURE — 73564 X-RAY EXAM KNEE 4 OR MORE: CPT | Mod: 50

## 2022-04-08 PROCEDURE — 99213 OFFICE O/P EST LOW 20 MIN: CPT

## 2022-04-08 RX ORDER — DICLOFENAC SODIUM 1% 10 MG/G
1 GEL TOPICAL
Qty: 100 | Refills: 0 | Status: ACTIVE | COMMUNITY
Start: 2022-04-08 | End: 1900-01-01

## 2022-04-08 NOTE — PHYSICAL EXAM
[de-identified] : The patient appears well nourished and in no apparent distress. The patient is alert and oriented to person, place, and time. Affect and mood appear normal. The head is normocephalic and atraumatic. The eyes reveal normal sclera and extra ocular muscles are intact. The neck appears normal with no jugular venous distention or masses noted. Skin shows normal turgor with no evidence of eczema or psoriasis; there is a small bruise to the center of the left knee. No respiratory distress noted. The patient ambulates with an antalgic gait.\par \par Exam of the right knee reveals a incision that is well-healed. There is range of motion from 0 to 120 degrees. There is no pain with range of motion. There is no instability to varus or valgus stress. Extensor mechanism is intact with good strength. There is a moderate to large effusion and some swelling about the joint capsule just medial to the patella tendon. No warmth or erythema is noted. No ecchymosis is noted. Pulses are intact distally. Capillary refill is normal. There is no edema or lymphadenopathy.\par \par The left knee has range of motion from 0 to 130 degrees. There is mild discomfort with terminal range of motion. There is tenderness about the medial and lateral joint lines. There is tenderness about the patellofemoral joint. There is crepitus with range of motion. There is a negative Deacon sign. There is no effusion. There is no soft tissue swelling, warmth, or erythema. There is a negative Lachman sign. There is no instability to varus/valgus stress or anterior/posterior drawer. There is normal strength in the in the quadriceps and hamstring muscles. Sensation is intact to the lower extremity. There are normal pulses distally and good capillary refill. No edema or lymphadenopathy noted.  [de-identified] : AP, lateral, and merchant views of the right knee were obtained. The patient is status post total knee replacement. The components are well fixed with good alignment. No evidence of loosening or periprosthetic fracture is noted.\par \par AP, lateral, tunnel, and merchant views of the left knee were obtained. There is chondrocalcinosis about the medial lateral menisci. There are osteophytes about the medial joint line. There is a lateral tibial plateau osteophyte. There is severe narrowing of the patellofemoral joint laterally with subluxation laterally. No fractures or dislocations are noted. \par \par \par

## 2022-04-08 NOTE — HISTORY OF PRESENT ILLNESS
[de-identified] : The patient presents today with c/o right knee pain s/p fall last week; she has a hx of right TKR in 2013 with Dr. Miller. The patient stated that the sole of her shoe came off and she fell forward and hit her bilateral knees, her right shoulder, and the right side of her head. She stated she has no pain to the left knee, shoulder or head. She is c/o pain and swelling to the right knee. She states that she is unable to take NSAIDs due to a hx of GI ulcer and rectal bleed. She has tried Tylenol without any relief. Pain is 9/10. She denies any numbness or tingling but states that she has some stiffness. She is here for x-rays.

## 2022-04-08 NOTE — DISCUSSION/SUMMARY
[de-identified] : The case was d/w Dr. Miller and x-rays were reviewed by MD. The patient cannot tolerate oral NSAIDs so we will prescribe her diclofenac gel to be applied 3-4 times daily. She was instructed to ice frequently and rest the right knee. The patient was instructed to f/u on Monday in the office with Dr. Miller if there is no improvement in pain by then. The patient verbalized understanding.

## 2022-04-08 NOTE — REASON FOR VISIT
[Follow-Up Visit] : a follow-up visit for [FreeTextEntry2] : bilateral knee pain s/p fall on Friday.

## 2022-04-08 NOTE — REVIEW OF SYSTEMS
[Joint Pain] : joint pain [Joint Stiffness] : joint stiffness [Joint Swelling] : joint swelling [FreeTextEntry9] : Right knee pain

## 2023-10-03 ENCOUNTER — APPOINTMENT (OUTPATIENT)
Dept: ORTHOPEDIC SURGERY | Facility: CLINIC | Age: 88
End: 2023-10-03
Payer: MEDICARE

## 2023-10-03 ENCOUNTER — NON-APPOINTMENT (OUTPATIENT)
Age: 88
End: 2023-10-03

## 2023-10-03 VITALS — SYSTOLIC BLOOD PRESSURE: 177 MMHG | DIASTOLIC BLOOD PRESSURE: 84 MMHG | HEART RATE: 57 BPM

## 2023-10-03 DIAGNOSIS — Z96.651 PRESENCE OF RIGHT ARTIFICIAL KNEE JOINT: ICD-10-CM

## 2023-10-03 DIAGNOSIS — M54.10 RADICULOPATHY, SITE UNSPECIFIED: ICD-10-CM

## 2023-10-03 PROCEDURE — 73562 X-RAY EXAM OF KNEE 3: CPT | Mod: RT

## 2023-10-03 PROCEDURE — 99213 OFFICE O/P EST LOW 20 MIN: CPT

## 2023-10-03 RX ORDER — MELOXICAM 15 MG/1
15 TABLET ORAL DAILY
Qty: 30 | Refills: 0 | Status: ACTIVE | COMMUNITY
Start: 2023-10-03 | End: 1900-01-01

## 2023-10-04 PROBLEM — M54.10 RADICULAR PAIN OF RIGHT LOWER EXTREMITY: Status: ACTIVE | Noted: 2023-10-04

## 2023-10-04 PROBLEM — Z96.651 STATUS POST TOTAL RIGHT KNEE REPLACEMENT: Status: ACTIVE | Noted: 2018-04-23

## 2024-11-25 ENCOUNTER — APPOINTMENT (OUTPATIENT)
Dept: ORTHOPEDIC SURGERY | Facility: CLINIC | Age: 89
End: 2024-11-25
Payer: MEDICARE

## 2024-11-25 DIAGNOSIS — Z96.651 PRESENCE OF RIGHT ARTIFICIAL KNEE JOINT: ICD-10-CM

## 2024-11-25 PROCEDURE — 99214 OFFICE O/P EST MOD 30 MIN: CPT

## 2024-11-25 PROCEDURE — 73562 X-RAY EXAM OF KNEE 3: CPT | Mod: RT

## 2025-09-10 ENCOUNTER — APPOINTMENT (OUTPATIENT)
Dept: ORTHOPEDIC SURGERY | Facility: CLINIC | Age: OVER 89
End: 2025-09-10
Payer: MEDICARE

## 2025-09-10 DIAGNOSIS — Z96.651 PRESENCE OF RIGHT ARTIFICIAL KNEE JOINT: ICD-10-CM

## 2025-09-10 DIAGNOSIS — M54.10 RADICULOPATHY, SITE UNSPECIFIED: ICD-10-CM

## 2025-09-10 PROCEDURE — 73562 X-RAY EXAM OF KNEE 3: CPT | Mod: RT

## 2025-09-10 PROCEDURE — 99214 OFFICE O/P EST MOD 30 MIN: CPT

## 2025-09-10 RX ORDER — MELOXICAM 15 MG/1
15 TABLET ORAL DAILY
Qty: 30 | Refills: 3 | Status: ACTIVE | COMMUNITY
Start: 2025-09-10 | End: 1900-01-01